# Patient Record
Sex: FEMALE | Race: WHITE | NOT HISPANIC OR LATINO
[De-identification: names, ages, dates, MRNs, and addresses within clinical notes are randomized per-mention and may not be internally consistent; named-entity substitution may affect disease eponyms.]

---

## 2018-09-21 ENCOUNTER — RESULT CHARGE (OUTPATIENT)
Age: 73
End: 2018-09-21

## 2018-09-21 ENCOUNTER — APPOINTMENT (OUTPATIENT)
Dept: ENDOCRINOLOGY | Facility: CLINIC | Age: 73
End: 2018-09-21
Payer: MEDICARE

## 2018-09-21 VITALS
WEIGHT: 203 LBS | BODY MASS INDEX: 37.36 KG/M2 | DIASTOLIC BLOOD PRESSURE: 60 MMHG | SYSTOLIC BLOOD PRESSURE: 130 MMHG | HEIGHT: 62 IN | HEART RATE: 70 BPM

## 2018-09-21 DIAGNOSIS — Z87.39 PERSONAL HISTORY OF OTHER DISEASES OF THE MUSCULOSKELETAL SYSTEM AND CONNECTIVE TISSUE: ICD-10-CM

## 2018-09-21 DIAGNOSIS — I48.92 UNSPECIFIED ATRIAL FLUTTER: ICD-10-CM

## 2018-09-21 DIAGNOSIS — H81.10 BENIGN PAROXYSMAL VERTIGO, UNSPECIFIED EAR: ICD-10-CM

## 2018-09-21 DIAGNOSIS — N28.1 CYST OF KIDNEY, ACQUIRED: ICD-10-CM

## 2018-09-21 DIAGNOSIS — G56.03 CARPAL TUNNEL SYNDROM,BILATERAL UPPER LIMBS: ICD-10-CM

## 2018-09-21 DIAGNOSIS — I25.10 ATHEROSCLEROTIC HEART DISEASE OF NATIVE CORONARY ARTERY W/OUT ANGINA PECTORIS: ICD-10-CM

## 2018-09-21 DIAGNOSIS — I47.1 SUPRAVENTRICULAR TACHYCARDIA: ICD-10-CM

## 2018-09-21 DIAGNOSIS — I10 ESSENTIAL (PRIMARY) HYPERTENSION: ICD-10-CM

## 2018-09-21 LAB — GLUCOSE BLDC GLUCOMTR-MCNC: 176

## 2018-09-21 PROCEDURE — 99214 OFFICE O/P EST MOD 30 MIN: CPT | Mod: 25

## 2018-09-21 RX ORDER — ASPIRIN 81 MG/1
81 TABLET, COATED ORAL DAILY
Refills: 0 | Status: ACTIVE | COMMUNITY

## 2018-09-21 RX ORDER — DILTIAZEM HYDROCHLORIDE 180 MG/1
180 TABLET, EXTENDED RELEASE ORAL DAILY
Refills: 0 | Status: ACTIVE | COMMUNITY

## 2018-09-21 RX ORDER — CARVEDILOL 6.25 MG/1
6.25 TABLET, FILM COATED ORAL TWICE DAILY
Refills: 0 | Status: ACTIVE | COMMUNITY

## 2018-09-21 RX ORDER — LISINOPRIL 20 MG/1
20 TABLET ORAL DAILY
Refills: 0 | Status: ACTIVE | COMMUNITY

## 2018-10-16 ENCOUNTER — MEDICATION RENEWAL (OUTPATIENT)
Age: 73
End: 2018-10-16

## 2018-12-26 ENCOUNTER — MEDICATION RENEWAL (OUTPATIENT)
Age: 73
End: 2018-12-26

## 2019-01-03 ENCOUNTER — RECORD ABSTRACTING (OUTPATIENT)
Age: 74
End: 2019-01-03

## 2019-01-08 ENCOUNTER — MEDICATION RENEWAL (OUTPATIENT)
Age: 74
End: 2019-01-08

## 2019-01-11 ENCOUNTER — APPOINTMENT (OUTPATIENT)
Dept: ENDOCRINOLOGY | Facility: CLINIC | Age: 74
End: 2019-01-11
Payer: MEDICARE

## 2019-01-11 ENCOUNTER — RESULT CHARGE (OUTPATIENT)
Age: 74
End: 2019-01-11

## 2019-01-11 VITALS
HEIGHT: 62 IN | HEART RATE: 83 BPM | DIASTOLIC BLOOD PRESSURE: 70 MMHG | BODY MASS INDEX: 37.36 KG/M2 | SYSTOLIC BLOOD PRESSURE: 130 MMHG | WEIGHT: 203 LBS

## 2019-01-11 DIAGNOSIS — L65.8 OTHER SPECIFIED NONSCARRING HAIR LOSS: ICD-10-CM

## 2019-01-11 DIAGNOSIS — L68.0 HIRSUTISM: ICD-10-CM

## 2019-01-11 DIAGNOSIS — R09.89 OTHER SPECIFIED SYMPTOMS AND SIGNS INVOLVING THE CIRCULATORY AND RESPIRATORY SYSTEMS: ICD-10-CM

## 2019-01-11 DIAGNOSIS — E11.65 TYPE 2 DIABETES MELLITUS WITH HYPERGLYCEMIA: ICD-10-CM

## 2019-01-11 LAB — GLUCOSE BLDC GLUCOMTR-MCNC: 128

## 2019-01-11 PROCEDURE — 82962 GLUCOSE BLOOD TEST: CPT

## 2019-01-11 PROCEDURE — 99215 OFFICE O/P EST HI 40 MIN: CPT | Mod: 25

## 2019-01-31 ENCOUNTER — APPOINTMENT (OUTPATIENT)
Dept: ENDOCRINOLOGY | Facility: CLINIC | Age: 74
End: 2019-01-31
Payer: MEDICARE

## 2019-01-31 PROCEDURE — 76536 US EXAM OF HEAD AND NECK: CPT

## 2019-04-11 LAB
HBA1C MFR BLD HPLC: 6.4
LDLC SERPL DIRECT ASSAY-MCNC: 48
MICROALBUMIN/CREAT 24H UR-RTO: 39

## 2019-04-12 ENCOUNTER — APPOINTMENT (OUTPATIENT)
Dept: ENDOCRINOLOGY | Facility: CLINIC | Age: 74
End: 2019-04-12
Payer: MEDICARE

## 2019-04-12 ENCOUNTER — RESULT CHARGE (OUTPATIENT)
Age: 74
End: 2019-04-12

## 2019-04-12 VITALS
HEIGHT: 62 IN | BODY MASS INDEX: 37.54 KG/M2 | WEIGHT: 204 LBS | SYSTOLIC BLOOD PRESSURE: 126 MMHG | HEART RATE: 70 BPM | DIASTOLIC BLOOD PRESSURE: 66 MMHG

## 2019-04-12 LAB — GLUCOSE BLDC GLUCOMTR-MCNC: 139

## 2019-04-12 PROCEDURE — 99214 OFFICE O/P EST MOD 30 MIN: CPT

## 2019-04-12 RX ORDER — SYRINGE AND NEEDLE,INSULIN,1ML 30 G X1/2"
30G X 1/2" SYRINGE, EMPTY DISPOSABLE MISCELLANEOUS
Qty: 1 | Refills: 1 | Status: DISCONTINUED | COMMUNITY
Start: 2019-01-08 | End: 2019-04-12

## 2019-04-12 RX ORDER — METHIMAZOLE 5 MG/1
5 TABLET ORAL DAILY
Qty: 90 | Refills: 0 | Status: DISCONTINUED | COMMUNITY
End: 2019-04-12

## 2019-04-12 RX ORDER — GLIMEPIRIDE 1 MG/1
1 TABLET ORAL DAILY
Qty: 90 | Refills: 1 | Status: DISCONTINUED | COMMUNITY
End: 2019-04-12

## 2019-04-23 ENCOUNTER — MEDICATION RENEWAL (OUTPATIENT)
Age: 74
End: 2019-04-23

## 2019-05-28 ENCOUNTER — MEDICATION RENEWAL (OUTPATIENT)
Age: 74
End: 2019-05-28

## 2019-06-03 ENCOUNTER — RX RENEWAL (OUTPATIENT)
Age: 74
End: 2019-06-03

## 2019-07-11 ENCOUNTER — APPOINTMENT (OUTPATIENT)
Dept: ENDOCRINOLOGY | Facility: CLINIC | Age: 74
End: 2019-07-11
Payer: MEDICARE

## 2019-07-11 PROCEDURE — 76536 US EXAM OF HEAD AND NECK: CPT

## 2019-07-19 ENCOUNTER — RESULT CHARGE (OUTPATIENT)
Age: 74
End: 2019-07-19

## 2019-07-19 ENCOUNTER — APPOINTMENT (OUTPATIENT)
Dept: ENDOCRINOLOGY | Facility: CLINIC | Age: 74
End: 2019-07-19
Payer: MEDICARE

## 2019-07-19 VITALS
HEART RATE: 72 BPM | DIASTOLIC BLOOD PRESSURE: 70 MMHG | SYSTOLIC BLOOD PRESSURE: 140 MMHG | BODY MASS INDEX: 38.21 KG/M2 | HEIGHT: 61.5 IN | WEIGHT: 205 LBS

## 2019-07-19 LAB — GLUCOSE BLDC GLUCOMTR-MCNC: 196

## 2019-07-19 PROCEDURE — 82962 GLUCOSE BLOOD TEST: CPT

## 2019-07-19 PROCEDURE — 99215 OFFICE O/P EST HI 40 MIN: CPT | Mod: 25

## 2019-10-18 ENCOUNTER — APPOINTMENT (OUTPATIENT)
Dept: ENDOCRINOLOGY | Facility: CLINIC | Age: 74
End: 2019-10-18
Payer: MEDICARE

## 2019-10-18 ENCOUNTER — RESULT CHARGE (OUTPATIENT)
Age: 74
End: 2019-10-18

## 2019-10-18 VITALS
SYSTOLIC BLOOD PRESSURE: 138 MMHG | WEIGHT: 204 LBS | HEART RATE: 64 BPM | DIASTOLIC BLOOD PRESSURE: 70 MMHG | BODY MASS INDEX: 38.02 KG/M2 | HEIGHT: 61.5 IN

## 2019-10-18 DIAGNOSIS — Z79.899 OTHER LONG TERM (CURRENT) DRUG THERAPY: ICD-10-CM

## 2019-10-18 LAB — GLUCOSE BLDC GLUCOMTR-MCNC: 164

## 2019-10-18 PROCEDURE — 82962 GLUCOSE BLOOD TEST: CPT

## 2019-10-18 PROCEDURE — 99215 OFFICE O/P EST HI 40 MIN: CPT | Mod: 25

## 2019-10-18 RX ORDER — METFORMIN HYDROCHLORIDE 500 MG/1
500 TABLET, COATED ORAL
Qty: 120 | Refills: 0 | Status: DISCONTINUED | COMMUNITY
Start: 2019-07-19 | End: 2019-10-18

## 2019-10-18 NOTE — HISTORY OF PRESENT ILLNESS
[FreeTextEntry1] : 1. T2DM\par Quality: Type 2\par Severity: moderate\par Duration: 20+ years\par Onset: blood test\par \par ASSOCIATED SYMPTOMS/COMPLICATIONS:\par 1/14/19 (+) DR with DME, no recent injections, stable\par (+) microalbuminuria on ACEi\par (-) neuropathy\par 2015 cardiac cath - nonobstructive disease, follows with cardio - stress test/ECHO 2018 - okay per pt\par (+) PAD - see vascular consult, asymptomatic\par \par MODIFYING FACTORS: better with diet, improved with meds\par Diet: trying to watch diet\par Exercise: none\par Current DM meds:\par Lantus 14 units at HS (using vials)\par Glimepiride 2 mg daily\par Januvia 100 mg daily\par  mg 2 tabs BID\par \par SMBG: testing 1x on most days, meter reviewed 100-150's. lowest FS 65. denies hypoglycemia\par \par 2. 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 (+) palpitations. denies changes in bowel habits. denies weight changes. \par \par 3. Thyroid nodules Dx 2011 with benign FNA - denies anterior neck pain, dysphagia. some voice changes - strain when talking\par \par 4. HLD - on Lipitor 40 mg daily\par \par \par \par \par \par

## 2019-10-18 NOTE — DATA REVIEWED
[FreeTextEntry1] : Thyroid FNA 3/16/16 RMP nodule - benign follicular nodule with post hemorrhagic change\par \par Thyroid sono 5/2018 in office - stable thyroid nodules\par \par Thyroid Ultrasound Report 1/31/19 \par Comparison: Report dated 5/17/18. \par Right thyroid lobe  5.9x2.2x2.6   cm - heterogeneous gland\par Right upper pole heterogeneous nodule 7x4 mm - not vascular, new\par Right mid pole nodule 1.0x.7x1.0cm - vascular, (+) microcalcifications, stable, prevous FNA 2016 was benign\par Left Thyroid lobe   6.0x2.8x2.7  cm - heterogeneous gland\par Left upper pole heterogeneous nodule 7x4 mm -stable\par prior Left mid pole calcification not seen on images\par Isthmus .3  cm\par Impression\par new small Right thyroid nodule, stable right and left thyroid nodules.  Largest Right thyroid nodule previously biopsied and reported as benign\par repeat sono 6 months \par \par Thyroid Ultrasound Report 7/11/19 \par Comparison: Report dated 1/31/19. \par Findings: \par Right thyroid lobe  5.4x2.4x2.5   cm, enlarged, heterogeneous\par Right upper pole nodule 9x5x6 mm - increased size, hypoechoic, not vascular\par Right mid pole nodule 7x6x10 mm with coarse calcification 1 mm- hypoechoic, stable, benign FNA in 2016\par Left Thyroid lobe   5.4x2.5x2.7  cm, enlarged, heterogeneous\par Left upper pole nodule 7x5x5 mm - hypoechoic, not vascular, stable\par Isthmus .3  cm\par Impression\par Right upper pole nodule - slowly increasing in size, repeat sono 4 months\par stable Right mid pole and Left upper pole nodules \par \par labs 9/14/18\par Cr 0.95\par GFR 60\par neg urine microalb/Cr 20\par LDL chol 37, HDl 51\par TSh 2.10 Ft4 1.1, FT3 2.8\par B12 1064\par \par Labs 4/5/19\par Gluc 115, A1c 6.4%\par Cr 0.86, GFR 68\par urine microalb/Cr 39\par LDL 48, HDL 52, Tg 104\par TSH 1.43, FT4 1.0\par A1c 6.5%\par Vit D 31\par \par Labs 1/4/19\par Cr 0.95, GFR 60\par urine microalb/Cr 34\par LDl chol 54, Tg 124\par TSh 2.03, FT4 1.2\par A1c 6.3%\par \par Labs 7/12/19\par A1c 7.4%\par urine microalb/Cr 29\par LDL 50, HDL 41, Tg 143\par TSH 0.57, FT4 1.1, Ft3 3.0\par \par Lans 10/4/19\par Gluc 137, A1c 6.5%\par Cr 0.89, GFR 65\par LDL 39, HDL 43, Tg 93\par TSH 0.15, FT4 1.2, FT3 3.6

## 2019-10-18 NOTE — PHYSICAL EXAM
[Alert] : alert [No Acute Distress] : no acute distress [Well Developed] : well developed [Well Nourished] : well nourished [Normal Sclera/Conjunctiva] : normal sclera/conjunctiva [EOMI] : extra ocular movement intact [No LAD] : no lymphadenopathy [Normal Rate and Effort] : normal respiratory rhythm and effort [Clear to Auscultation] : lungs were clear to auscultation bilaterally [Normal Rate] : heart rate was normal  [Normal S1, S2] : normal S1 and S2 [No Edema] : there was no peripheral edema [Normal Bowel Sounds] : normal bowel sounds [Soft] : abdomen soft [Not Tender] : non-tender [Not Distended] : not distended [Normal Gait] : normal gait [No Clubbing, Cyanosis] : no clubbing  or cyanosis of the fingernails [Cranial Nerves Intact] : cranial nerves 2-12 were intact [Normal Reflexes] : deep tendon reflexes were 2+ and symmetric [No Tremors] : no tremors [Oriented x3] : oriented to person, place, and time [Normal Insight/Judgement] : insight and judgment were intact [Normal Affect] : the affect was normal [de-identified] : bilateral thyroid nodules - firm, nontender

## 2019-10-18 NOTE — REVIEW OF SYSTEMS
[Blurry Vision] : blurred vision [Palpitations] : palpitations [SOB on Exertion] : shortness of breath during exertion [Nocturia] : nocturia [Myalgia] : myalgia  [Joint Stiffness] : joint stiffness [Back Pain] : back pain [Hair Loss] : hair loss [As Noted in HPI] : as noted in HPI [Fatigue] : no fatigue [Shortness Of Breath] : no shortness of breath [Chest Pain] : no chest pain [Nausea] : no nausea [Vomiting] : no vomiting was observed [Constipation] : no constipation [Diarrhea] : no diarrhea [Polyuria] : no polyuria [Abdominal Pain] : no abdominal pain [Pain/Numbness of Digits] : no pain/numbness of digits [Tremors] : no tremors [Anxiety] : no anxiety [Depression] : no depression [Polydipsia] : no polydipsia [FreeTextEntry3] : follows with retina [FreeTextEntry6] : cardio aware [FreeTextEntry5] : intermittent, following with cardio [FreeTextEntry8] : 1-2x nightly [FreeTextEntry9] : ankle stiffness, following with vascular [de-identified] : chin hairs

## 2019-10-18 NOTE — ASSESSMENT
[FreeTextEntry1] : 1. 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, CAD, microalbuminuria, PAD -improved A1c\par - cont Januvia and MFN and Glimepiride\par - cont Lantus\par - repeat A1c 3 months\par - cont SMBG, call with highs/lows\par - cont lifestyle changes\par - f/u cardio\par - f/u vascular as needed, if sx develop\par - cont Lisinopril \par - check B12 on MFN\par \par 2. Thyroid nodules, increased size of Right nodule on recent sono, repeat sono before next visit\par \par 3. Hyperthyroid - TSh now low off Methimazole and pt experiencing some palpitatiosn - restart MMi 5 mg daily\par \par 4. Hyperlipidemia - stable, cont statin\par \par \par

## 2019-10-18 NOTE — REASON FOR VISIT
[Follow-Up: _____] : a [unfilled] follow-up visit [FreeTextEntry1] : T2DM, hyperthyroidism, thyroid nodules, hyperlipidemia

## 2019-11-14 ENCOUNTER — APPOINTMENT (OUTPATIENT)
Dept: ENDOCRINOLOGY | Facility: CLINIC | Age: 74
End: 2019-11-14
Payer: MEDICARE

## 2019-11-14 ENCOUNTER — RX RENEWAL (OUTPATIENT)
Age: 74
End: 2019-11-14

## 2019-11-14 PROCEDURE — 76536 US EXAM OF HEAD AND NECK: CPT

## 2019-11-25 ENCOUNTER — MEDICATION RENEWAL (OUTPATIENT)
Age: 74
End: 2019-11-25

## 2019-12-16 ENCOUNTER — RX RENEWAL (OUTPATIENT)
Age: 74
End: 2019-12-16

## 2020-01-07 ENCOUNTER — RX RENEWAL (OUTPATIENT)
Age: 75
End: 2020-01-07

## 2020-01-17 ENCOUNTER — APPOINTMENT (OUTPATIENT)
Dept: ENDOCRINOLOGY | Facility: CLINIC | Age: 75
End: 2020-01-17
Payer: MEDICARE

## 2020-01-17 ENCOUNTER — RESULT CHARGE (OUTPATIENT)
Age: 75
End: 2020-01-17

## 2020-01-17 VITALS
WEIGHT: 203 LBS | BODY MASS INDEX: 39.85 KG/M2 | HEIGHT: 60 IN | SYSTOLIC BLOOD PRESSURE: 142 MMHG | HEART RATE: 69 BPM | DIASTOLIC BLOOD PRESSURE: 74 MMHG

## 2020-01-17 LAB — GLUCOSE BLDC GLUCOMTR-MCNC: 143

## 2020-01-17 PROCEDURE — 99214 OFFICE O/P EST MOD 30 MIN: CPT | Mod: 25

## 2020-01-17 PROCEDURE — 82962 GLUCOSE BLOOD TEST: CPT

## 2020-01-17 NOTE — HISTORY OF PRESENT ILLNESS
[FreeTextEntry1] : since last visit Cspine issues with pinched nerve and going for PT\par \par 1. T2DM\par Quality: Type 2\par Severity: moderate\par Duration: 20+ years\par Onset: blood test\par \par ASSOCIATED SYMPTOMS/COMPLICATIONS:\par 1/14/19 (+) DR with DME, no recent injections, stable\par (+) microalbuminuria on ACEi\par (-) neuropathy\par 2015 cardiac cath - nonobstructive disease, follows with cardio - stress test/ECHO 2018 - okay per pt\par (+) PAD - see vascular consult, asymptomatic\par \par MODIFYING FACTORS: better with diet, improved with meds\par Diet: trying to watch diet\par Exercise: none\par Current DM meds:\par Lantus 14-15 units at HS (using vials)\par Glimepiride 2 mg daily\par Januvia 100 mg daily\par  mg 2 tabs BID\par \par SMBG: testing 1x on most days, meter reviewed 's. lowest FS 65 - once since last visit, due to decreased PO intake\par \par 2. 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  (+) palpitations, restarted MMI at last visit 5 mg daily. denies changes in bowel habits. denies weight changes. \par \par 3. Thyroid nodules Dx 2011 with benign FNA - denies anterior neck pain, dysphagia or voice changes. sometimes pills get stuck\par \par 4. HLD - on Lipitor 40 mg daily\par \par \par \par \par \par

## 2020-01-17 NOTE — PHYSICAL EXAM
[Alert] : alert [No Acute Distress] : no acute distress [Well Nourished] : well nourished [Normal Sclera/Conjunctiva] : normal sclera/conjunctiva [Well Developed] : well developed [EOMI] : extra ocular movement intact [No LAD] : no lymphadenopathy [Normal Rate and Effort] : normal respiratory rhythm and effort [Clear to Auscultation] : lungs were clear to auscultation bilaterally [Normal Rate] : heart rate was normal  [Normal S1, S2] : normal S1 and S2 [No Edema] : there was no peripheral edema [Normal Bowel Sounds] : normal bowel sounds [Not Tender] : non-tender [Soft] : abdomen soft [Not Distended] : not distended [Normal Gait] : normal gait [No Clubbing, Cyanosis] : no clubbing  or cyanosis of the fingernails [Cranial Nerves Intact] : cranial nerves 2-12 were intact [Normal Reflexes] : deep tendon reflexes were 2+ and symmetric [No Tremors] : no tremors [Oriented x3] : oriented to person, place, and time [Normal Insight/Judgement] : insight and judgment were intact [Normal Affect] : the affect was normal [Right Foot Was Examined] : right foot ~C was examined [Left Foot Was Examined] : left foot ~C was examined [2+] : 2+ in the dorsalis pedis [Vibration Dec.] : diminished vibratory sensation at the level of the toes [Foot Ulcers] : no foot ulcers [Position Sense Dec.] : normal position sense at the level of the toes [de-identified] : bilateral thyroid nodules - firm, nontender [Diminished Throughout Both Feet] : normal tactile sensation with monofilament testing throughout both feet

## 2020-01-17 NOTE — ASSESSMENT
[FreeTextEntry1] : 1. 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, CAD, microalbuminuria, PAD -worsening A1c due to holiday diet, b12 level ok on MFN\par - cont Januvia and MFN and Glimepiride\par - cont Lantus\par - restart diet\par - repeat A1c 3 months\par - cont SMBG, call with highs/lows, increase testing 2x daily, test before bedtime and have bedtime snack if FS <100\par - f/u cardio\par - f/u vascular as needed, if sx develop\par - cont Lisinopril \par - f/u retina\par \par 2. stable Thyroid nodules - repeat sono 11/2020\par \par 3. Hyperthyroid - euthyroid- contMMi 5 mg daily\par \par 4. Hyperlipidemia - stable, cont statin\par \par \par

## 2020-01-17 NOTE — REVIEW OF SYSTEMS
[Blurry Vision] : blurred vision [As Noted in HPI] : as noted in HPI [Palpitations] : palpitations [SOB on Exertion] : shortness of breath during exertion [Nocturia] : nocturia [Myalgia] : myalgia  [Back Pain] : back pain [Hair Loss] : hair loss [Depression] : depression [Recent Weight Gain (___ Lbs)] : no recent weight gain [Fatigue] : no fatigue [Chest Pain] : no chest pain [Recent Weight Loss (___ Lbs)] : no recent weight loss [Shortness Of Breath] : no shortness of breath [Nausea] : no nausea [Vomiting] : no vomiting was observed [Diarrhea] : no diarrhea [Constipation] : no constipation [Abdominal Pain] : no abdominal pain [Tremors] : no tremors [Polyuria] : no polyuria [Anxiety] : no anxiety [Pain/Numbness of Digits] : no pain/numbness of digits [Polydipsia] : no polydipsia [FreeTextEntry3] : follows with retina [Heat Intolerance] : heat tolerant [FreeTextEntry8] : 1-2x nightly [FreeTextEntry5] : intermittent, following with cardio [FreeTextEntry6] : cardio aware [de-identified] : chin hairs

## 2020-01-17 NOTE — DATA REVIEWED
[FreeTextEntry1] : Thyroid FNA 3/16/16 RMP nodule - benign follicular nodule with post hemorrhagic change\par \par Thyroid sono 5/2018 in office - stable thyroid nodules\par \par Thyroid Ultrasound Report 1/31/19 \par Comparison: Report dated 5/17/18. \par Right thyroid lobe  5.9x2.2x2.6   cm - heterogeneous gland\par Right upper pole heterogeneous nodule 7x4 mm - not vascular, new\par Right mid pole nodule 1.0x.7x1.0cm - vascular, (+) microcalcifications, stable, prevous FNA 2016 was benign\par Left Thyroid lobe   6.0x2.8x2.7  cm - heterogeneous gland\par Left upper pole heterogeneous nodule 7x4 mm -stable\par prior Left mid pole calcification not seen on images\par Isthmus .3  cm\par Impression\par new small Right thyroid nodule, stable right and left thyroid nodules.  Largest Right thyroid nodule previously biopsied and reported as benign\par repeat sono 6 months \par \par Thyroid Ultrasound Report 7/11/19 \par Comparison: Report dated 1/31/19. \par Findings: \par Right thyroid lobe  5.4x2.4x2.5   cm, enlarged, heterogeneous\par Right upper pole nodule 9x5x6 mm - increased size, hypoechoic, not vascular\par Right mid pole nodule 7x6x10 mm with coarse calcification 1 mm- hypoechoic, stable, benign FNA in 2016\par Left Thyroid lobe   5.4x2.5x2.7  cm, enlarged, heterogeneous\par Left upper pole nodule 7x5x5 mm - hypoechoic, not vascular, stable\par Isthmus .3  cm\par Impression\par Right upper pole nodule - slowly increasing in size, repeat sono 4 months\par stable Right mid pole and Left upper pole nodules\par \par Thyroid Ultrasound Report 11/14/19 \par \par Comparison: Report dated 7/11/19. \par Findings: \par Right thyroid lobe  4.9x2.2x2.2   cm - mildly heterogeneous\par Right upper pole nodule 9x5x6 mm, stable\par Right mid pole nodule [id number] mm with 1 mm coarse calcification, scant vascularity, stable, benign FNA in 2018\par Left Thyroid lobe  5.2x2.8x2.9   cm - mildly heterogeneous\par Left upper pole nodule 7x6x5 mm - hypoechoic, stable\par Isthmus  .3 cm\par Impression\par stable bilateral thyroid nodules\par largest Right mid pole thyroid nodule previously biopsied and reported as benign\par repeat thyroid sonogram 1 year  \par \par labs 9/14/18\par Cr 0.95\par GFR 60\par neg urine microalb/Cr 20\par LDL chol 37, HDl 51\par TSh 2.10 Ft4 1.1, FT3 2.8\par B12 1064\par \par Labs 4/5/19\par Gluc 115, A1c 6.4%\par Cr 0.86, GFR 68\par urine microalb/Cr 39\par LDL 48, HDL 52, Tg 104\par TSH 1.43, FT4 1.0\par A1c 6.5%\par Vit D 31\par \par Labs 1/4/19\par Cr 0.95, GFR 60\par urine microalb/Cr 34\par LDl chol 54, Tg 124\par TSh 2.03, FT4 1.2\par A1c 6.3%\par \par Labs 7/12/19\par A1c 7.4%\par urine microalb/Cr 29\par LDL 50, HDL 41, Tg 143\par TSH 0.57, FT4 1.1, Ft3 3.0\par \par Lans 10/4/19\par Gluc 137, A1c 6.5%\par Cr 0.89, GFR 65\par LDL 39, HDL 43, Tg 93\par TSH 0.15, FT4 1.2, FT3 3.6\par \par Labs 1/10/20\par Gluc 121, A1c 7.1\par Cr 0.85, GFR 68\par urine microalb/Cr 67\par LDL 46, HDL 57, Tg 78\par TSH 0.91, FT4 0.9\par mild anemia\par B12 707

## 2020-01-30 ENCOUNTER — RX RENEWAL (OUTPATIENT)
Age: 75
End: 2020-01-30

## 2020-04-27 ENCOUNTER — RX RENEWAL (OUTPATIENT)
Age: 75
End: 2020-04-27

## 2020-05-22 ENCOUNTER — RX RENEWAL (OUTPATIENT)
Age: 75
End: 2020-05-22

## 2020-07-15 ENCOUNTER — RX RENEWAL (OUTPATIENT)
Age: 75
End: 2020-07-15

## 2020-08-05 ENCOUNTER — RX RENEWAL (OUTPATIENT)
Age: 75
End: 2020-08-05

## 2020-10-10 ENCOUNTER — RX RENEWAL (OUTPATIENT)
Age: 75
End: 2020-10-10

## 2020-10-18 ENCOUNTER — RX RENEWAL (OUTPATIENT)
Age: 75
End: 2020-10-18

## 2020-11-10 ENCOUNTER — RX RENEWAL (OUTPATIENT)
Age: 75
End: 2020-11-10

## 2020-11-10 LAB
HBA1C MFR BLD HPLC: 6.8
LDLC SERPL DIRECT ASSAY-MCNC: 48

## 2020-11-11 ENCOUNTER — APPOINTMENT (OUTPATIENT)
Dept: ENDOCRINOLOGY | Facility: CLINIC | Age: 75
End: 2020-11-11
Payer: MEDICARE

## 2020-11-11 VITALS
OXYGEN SATURATION: 96 % | HEIGHT: 62 IN | SYSTOLIC BLOOD PRESSURE: 156 MMHG | DIASTOLIC BLOOD PRESSURE: 60 MMHG | BODY MASS INDEX: 38.09 KG/M2 | WEIGHT: 207 LBS | HEART RATE: 79 BPM

## 2020-11-11 LAB — GLUCOSE BLDC GLUCOMTR-MCNC: 80

## 2020-11-11 PROCEDURE — 82962 GLUCOSE BLOOD TEST: CPT

## 2020-11-11 PROCEDURE — 99214 OFFICE O/P EST MOD 30 MIN: CPT | Mod: 25

## 2020-11-11 RX ORDER — ATORVASTATIN CALCIUM 80 MG/1
80 TABLET, FILM COATED ORAL
Qty: 90 | Refills: 0 | Status: ACTIVE | COMMUNITY
Start: 2020-09-24

## 2020-11-11 RX ORDER — ATORVASTATIN CALCIUM 40 MG/1
40 TABLET, FILM COATED ORAL DAILY
Refills: 0 | Status: DISCONTINUED | COMMUNITY
End: 2020-11-11

## 2020-11-11 NOTE — PHYSICAL EXAM
[Alert] : alert [Well Nourished] : well nourished [Obese] : obese [No Acute Distress] : no acute distress [EOMI] : extra ocular movement intact [No LAD] : no lymphadenopathy [Thyroid Not Enlarged] : the thyroid was not enlarged [No Thyroid Nodules] : no palpable thyroid nodules [No Accessory Muscle Use] : no accessory muscle use [Clear to Auscultation] : lungs were clear to auscultation bilaterally [Normal S1, S2] : normal S1 and S2 [Normal Rate] : heart rate was normal [No Edema] : no peripheral edema [Normal Bowel Sounds] : normal bowel sounds [Not Tender] : non-tender [Soft] : abdomen soft [Normal Gait] : normal gait [Acanthosis Nigricans] : no acanthosis nigricans [Right Foot Was Examined] : right foot ~C was examined [Left Foot Was Examined] : left foot ~C was examined [2+] : 2+ in the dorsalis pedis [Vibration Dec.] : diminished vibratory sensation at the level of the toes [Position Sense Dec.] : normal position sense at the level of the toes [Diminished Throughout Both Feet] : normal tactile sensation with monofilament testing throughout both feet [Cranial Nerves Intact] : cranial nerves 2-12 were intact [Oriented x3] : oriented to person, place, and time [Normal Affect] : the affect was normal [Normal Insight/Judgement] : insight and judgment were intact [Normal Mood] : the mood was normal
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
## 2020-11-11 NOTE — DATA REVIEWED
[FreeTextEntry1] : Thyroid FNA 3/16/16 RMP nodule - benign follicular nodule with post hemorrhagic change\par \par Thyroid sono 5/2018 in office - stable thyroid nodules\par \par Thyroid Ultrasound Report 1/31/19 \par Comparison: Report dated 5/17/18. \par Right thyroid lobe  5.9x2.2x2.6   cm - heterogeneous gland\par Right upper pole heterogeneous nodule 7x4 mm - not vascular, new\par Right mid pole nodule 1.0x.7x1.0cm - vascular, (+) microcalcifications, stable, prevous FNA 2016 was benign\par Left Thyroid lobe   6.0x2.8x2.7  cm - heterogeneous gland\par Left upper pole heterogeneous nodule 7x4 mm -stable\par prior Left mid pole calcification not seen on images\par Isthmus .3  cm\par Impression\par new small Right thyroid nodule, stable right and left thyroid nodules.  Largest Right thyroid nodule previously biopsied and reported as benign\par repeat sono 6 months \par \par Thyroid Ultrasound Report 7/11/19 \par Comparison: Report dated 1/31/19. \par Findings: \par Right thyroid lobe  5.4x2.4x2.5   cm, enlarged, heterogeneous\par Right upper pole nodule 9x5x6 mm - increased size, hypoechoic, not vascular\par Right mid pole nodule 7x6x10 mm with coarse calcification 1 mm- hypoechoic, stable, benign FNA in 2016\par Left Thyroid lobe   5.4x2.5x2.7  cm, enlarged, heterogeneous\par Left upper pole nodule 7x5x5 mm - hypoechoic, not vascular, stable\par Isthmus .3  cm\par Impression\par Right upper pole nodule - slowly increasing in size, repeat sono 4 months\par stable Right mid pole and Left upper pole nodules\par \par Thyroid Ultrasound Report 11/14/19 \par Comparison: Report dated 7/11/19. \par Findings: \par Right thyroid lobe  4.9x2.2x2.2   cm - mildly heterogeneous\par Right upper pole nodule 9x5x6 mm, stable\par Right mid pole nodule [id number] mm with 1 mm coarse calcification, scant vascularity, stable, benign FNA in 2018\par Left Thyroid lobe  5.2x2.8x2.9   cm - mildly heterogeneous\par Left upper pole nodule 7x6x5 mm - hypoechoic, stable\par Isthmus  .3 cm\par Impression\par stable bilateral thyroid nodules\par largest Right mid pole thyroid nodule previously biopsied and reported as benign\par repeat thyroid sonogram 1 year  \par \par labs 9/14/18\par Cr 0.95\par GFR 60\par neg urine microalb/Cr 20\par LDL chol 37, HDl 51\par TSh 2.10 Ft4 1.1, FT3 2.8\par B12 1064\par \par Labs 4/5/19\par Gluc 115, A1c 6.4%\par Cr 0.86, GFR 68\par urine microalb/Cr 39\par LDL 48, HDL 52, Tg 104\par TSH 1.43, FT4 1.0\par A1c 6.5%\par Vit D 31\par \par Labs 1/4/19\par Cr 0.95, GFR 60\par urine microalb/Cr 34\par LDl chol 54, Tg 124\par TSh 2.03, FT4 1.2\par A1c 6.3%\par \par Labs 7/12/19\par A1c 7.4%\par urine microalb/Cr 29\par LDL 50, HDL 41, Tg 143\par TSH 0.57, FT4 1.1, Ft3 3.0\par \par Lans 10/4/19\par Gluc 137, A1c 6.5%\par Cr 0.89, GFR 65\par LDL 39, HDL 43, Tg 93\par TSH 0.15, FT4 1.2, FT3 3.6\par \par Labs 1/10/20\par Gluc 121, A1c 7.1\par Cr 0.85, GFR 68\par urine microalb/Cr 67\par LDL 46, HDL 57, Tg 78\par TSH 0.91, FT4 0.9\par mild anemia\par B12 707\par \par Labs 11/4/20\par urine alb/Cr 39\par LDL 48, HDL 52, Tg 102\par Gluc 139, A1c 6.8\par Cr 0.90, GFR 63\par TSH 2.38, Ft4 1.1\par Hct 33.1

## 2020-11-11 NOTE — ASSESSMENT
[FreeTextEntry1] : 1. 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 stable Thyroid nodules - RTO thyroid sono\par \par 3. Hyperthyroid - euthyroid- cont MMi 5 mg daily\par \par 4. Hyperlipidemia - stable, cont statin\par \par 5. anemia, chronic by history - advvised heme eval\par \par \par

## 2020-11-11 NOTE — REVIEW OF SYSTEMS
[Shortness Of Breath] : shortness of breath [SOB on Exertion] : shortness of breath on exertion [Nocturia] : nocturia [Anxiety] : anxiety [Stress] : stress [Fatigue] : no fatigue [Recent Weight Gain (___ Lbs)] : no recent weight gain [Recent Weight Loss (___ Lbs)] : no recent weight loss [Blurred Vision] : no blurred vision [Chest Pain] : no chest pain [Palpitations] : no palpitations [Nausea] : no nausea [Abdominal Pain] : no abdominal pain [Diarrhea] : no diarrhea [Polyuria] : no polyuria [Joint Pain] : no joint pain [Myalgia] : no myalgia  [Pain/Numbness of Digits] : no pain/numbness of digits [Polydipsia] : no polydipsia [FreeTextEntry6] : cardio aware

## 2021-01-04 ENCOUNTER — RX RENEWAL (OUTPATIENT)
Age: 76
End: 2021-01-04

## 2021-01-12 ENCOUNTER — RX RENEWAL (OUTPATIENT)
Age: 76
End: 2021-01-12

## 2021-01-21 ENCOUNTER — APPOINTMENT (OUTPATIENT)
Dept: ENDOCRINOLOGY | Facility: CLINIC | Age: 76
End: 2021-01-21
Payer: MEDICARE

## 2021-01-21 PROCEDURE — 76536 US EXAM OF HEAD AND NECK: CPT

## 2021-02-01 ENCOUNTER — RX RENEWAL (OUTPATIENT)
Age: 76
End: 2021-02-01

## 2021-02-02 ENCOUNTER — NON-APPOINTMENT (OUTPATIENT)
Age: 76
End: 2021-02-02

## 2021-02-11 ENCOUNTER — RESULT CHARGE (OUTPATIENT)
Age: 76
End: 2021-02-11

## 2021-02-11 LAB
HBA1C MFR BLD HPLC: 7.9
LDLC SERPL DIRECT ASSAY-MCNC: 48
MICROALBUMIN/CREAT 24H UR-RTO: 52

## 2021-02-12 ENCOUNTER — APPOINTMENT (OUTPATIENT)
Dept: ENDOCRINOLOGY | Facility: CLINIC | Age: 76
End: 2021-02-12
Payer: MEDICARE

## 2021-02-12 VITALS
DIASTOLIC BLOOD PRESSURE: 66 MMHG | HEART RATE: 60 BPM | HEIGHT: 62 IN | BODY MASS INDEX: 37.73 KG/M2 | OXYGEN SATURATION: 98 % | WEIGHT: 205 LBS | SYSTOLIC BLOOD PRESSURE: 134 MMHG

## 2021-02-12 LAB — GLUCOSE BLDC GLUCOMTR-MCNC: 105

## 2021-02-12 PROCEDURE — 82962 GLUCOSE BLOOD TEST: CPT

## 2021-02-12 PROCEDURE — 99214 OFFICE O/P EST MOD 30 MIN: CPT | Mod: 25

## 2021-02-12 NOTE — ASSESSMENT
[FreeTextEntry1] : 1. 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, CAD, microalbuminuria, PAD  - siginificant worsening A1c due to stress and poor eating habits\par - cont Januvia and MFN\par - increase Glimepiride 4 mg daily\par - contLantus 12 units\par - restart diet\par - repeat A1c 3 months\par - f/u cardio\par - f/u vascular as needed, if sx develop\par - cont Lisinopril \par - f/u retina\par - counseled pt on stress reduction\par \par 2. thyroid nodules - worsening on recent sono, repeat thyroid sono 3 months to monitor new area of concern\par \par 3. Hyperthyroid - euthyroid- cont MMi 5 mg daily, repeat TFTs 1 week before next visit\par \par 4. Hyperlipidemia - stable, cont statin\par \par 5. anemia, chronic by history - advised heme eval\par \par 6. voice hoarseness - ENT eval\par \par \par

## 2021-02-12 NOTE — DATA REVIEWED
[FreeTextEntry1] : Thyroid FNA 3/16/16 RMP nodule - benign follicular nodule with post hemorrhagic change\par \par Thyroid sono 5/2018 in office - stable thyroid nodules\par \par Thyroid Ultrasound Report 1/31/19 \par Comparison: Report dated 5/17/18. \par Right thyroid lobe  5.9x2.2x2.6   cm - heterogeneous gland\par Right upper pole heterogeneous nodule 7x4 mm - not vascular, new\par Right mid pole nodule 1.0x.7x1.0cm - vascular, (+) microcalcifications, stable, prevous FNA 2016 was benign\par Left Thyroid lobe   6.0x2.8x2.7  cm - heterogeneous gland\par Left upper pole heterogeneous nodule 7x4 mm -stable\par prior Left mid pole calcification not seen on images\par Isthmus .3  cm\par Impression\par new small Right thyroid nodule, stable right and left thyroid nodules.  Largest Right thyroid nodule previously biopsied and reported as benign\par repeat sono 6 months \par \par Thyroid Ultrasound Report 7/11/19 \par Comparison: Report dated 1/31/19. \par Findings: \par Right thyroid lobe  5.4x2.4x2.5   cm, enlarged, heterogeneous\par Right upper pole nodule 9x5x6 mm - increased size, hypoechoic, not vascular\par Right mid pole nodule 7x6x10 mm with coarse calcification 1 mm- hypoechoic, stable, benign FNA in 2016\par Left Thyroid lobe   5.4x2.5x2.7  cm, enlarged, heterogeneous\par Left upper pole nodule 7x5x5 mm - hypoechoic, not vascular, stable\par Isthmus .3  cm\par Impression\par Right upper pole nodule - slowly increasing in size, repeat sono 4 months\par stable Right mid pole and Left upper pole nodules\par \par Thyroid Ultrasound Report 11/14/19 \par Comparison: Report dated 7/11/19. \par Findings: \par Right thyroid lobe  4.9x2.2x2.2   cm - mildly heterogeneous\par Right upper pole nodule 9x5x6 mm, stable\par Right mid pole nodule [id number] mm with 1 mm coarse calcification, scant vascularity, stable, benign FNA in 2018\par Left Thyroid lobe  5.2x2.8x2.9   cm - mildly heterogeneous\par Left upper pole nodule 7x6x5 mm - hypoechoic, stable\par Isthmus  .3 cm\par Impression\par stable bilateral thyroid nodules\par largest Right mid pole thyroid nodule previously biopsied and reported as benign\par repeat thyroid sonogram 1 year  \par \par Thyroid Ultrasound Report 1/21/21 \par Comparison: Report dated 11/14/19. \par Findings: \par Right thyroid lobe  5.1x2.4x2.3   cm -enlarged, homogeneous gland\par Right upper pole nodule 9x6x6 mm - hypoechoic, not vascular, stable\par Right mid pole nodule 12x7 mm with 1 mm coarse calcification - vascular, stable, benign FNA in 2018\par Left Thyroid lobe  5.9x2.7x2.5   cm - enlarged, homogeneous gland\par Left  upper pole nodule 8x6x6 mm - stable, hypoechoic\par Left Lower pole area [id number] mm - heterogeneous are vs nodule\par Isthmus  .4 cm\par Impression\par stable bilateral thyroid nodule\par possible new Left lower pole nodule vs patchy area of heterogeneous tissue\par repeat sonogram 4 months \par =======================================================\par labs 9/14/18\par Cr 0.95\par GFR 60\par neg urine microalb/Cr 20\par LDL chol 37, HDl 51\par TSh 2.10 Ft4 1.1, FT3 2.8\par B12 1064\par \par Labs 4/5/19\par Gluc 115, A1c 6.4%\par Cr 0.86, GFR 68\par urine microalb/Cr 39\par LDL 48, HDL 52, Tg 104\par TSH 1.43, FT4 1.0\par A1c 6.5%\par Vit D 31\par \par Labs 1/4/19\par Cr 0.95, GFR 60\par urine microalb/Cr 34\par LDl chol 54, Tg 124\par TSh 2.03, FT4 1.2\par A1c 6.3%\par \par Labs 7/12/19\par A1c 7.4%\par urine microalb/Cr 29\par LDL 50, HDL 41, Tg 143\par TSH 0.57, FT4 1.1, Ft3 3.0\par \par Lans 10/4/19\par Gluc 137, A1c 6.5%\par Cr 0.89, GFR 65\par LDL 39, HDL 43, Tg 93\par TSH 0.15, FT4 1.2, FT3 3.6\par \par Labs 1/10/20\par Gluc 121, A1c 7.1\par Cr 0.85, GFR 68\par urine microalb/Cr 67\par LDL 46, HDL 57, Tg 78\par TSH 0.91, FT4 0.9\par mild anemia\par B12 707\par \par Labs 11/4/20\par urine alb/Cr 39\par LDL 48, HDL 52, Tg 102\par Gluc 139, A1c 6.8\par Cr 0.90, GFR 63\par TSH 2.38, Ft4 1.1\par Hct 33.1\par \par Labs 2/4/21\par urine alb/Cr 52\par LDL 48, HDL 41, Tg 121\par GLuc 164, A1c 7.9\par TSH 2.14\par Cr 0.91, GFR 63\par Hb 10.7, Hct 33.3\par B12 772

## 2021-02-12 NOTE — PHYSICAL EXAM
[Alert] : alert [Well Nourished] : well nourished [Obese] : obese [No Acute Distress] : no acute distress [EOMI] : extra ocular movement intact [No LAD] : no lymphadenopathy [Thyroid Not Enlarged] : the thyroid was not enlarged [No Thyroid Nodules] : no palpable thyroid nodules [No Accessory Muscle Use] : no accessory muscle use [Clear to Auscultation] : lungs were clear to auscultation bilaterally [Normal S1, S2] : normal S1 and S2 [Normal Rate] : heart rate was normal [No Edema] : no peripheral edema [Normal Bowel Sounds] : normal bowel sounds [Not Tender] : non-tender [Soft] : abdomen soft [Normal Gait] : normal gait [Cranial Nerves Intact] : cranial nerves 2-12 were intact [Oriented x3] : oriented to person, place, and time [Normal Affect] : the affect was normal [Normal Insight/Judgement] : insight and judgment were intact [Normal Mood] : the mood was normal [Acanthosis Nigricans] : no acanthosis nigricans

## 2021-02-12 NOTE — HISTORY OF PRESENT ILLNESS
[FreeTextEntry1] : Interval Hx - stress at home  w vielka\par \par 1. T2DM\par Quality: Type 2\par Severity: moderate\par Duration: 20+ years\par Onset: blood test\par \par ASSOCIATED SYMPTOMS/COMPLICATIONS:\par 1/14/19 (+) DR with DME, no recent injections, stable\par (+) microalbuminuria on ACEi\par (-) neuropathy\par 2015 cardiac cath - nonobstructive disease, follows with cardio - stress test/ECHO 2018 - okay per pt\par (+) PAD -  asymptomatic\par \par MODIFYING FACTORS: worse due to diet, stress eating\par Diet: stress eating\par Exercise: none\par Current DM meds:\par Lantus 12 units at HS (using vials)\par Glimepiride 2 mg daily\par Januvia 100 mg daily\par  mg 2 tabs BID\par \par SMBG: testing 1x, per pt FS ;s\par \par 2. 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  (+) palpitations and restarted MMI 10/2019 5 mg daily. denies changes in bowel habits. denies weight changes. \par \par 3. Thyroid nodules Dx 2011 with benign FNA - denies anterior neck pain, dysphagia. (+) occasional raspiness when stretching neck\par \par 4. HLD - on Lipitor 40 mg daily\par \par \par \par \par \par

## 2021-02-23 ENCOUNTER — RX RENEWAL (OUTPATIENT)
Age: 76
End: 2021-02-23

## 2021-05-19 ENCOUNTER — APPOINTMENT (OUTPATIENT)
Dept: ENDOCRINOLOGY | Facility: CLINIC | Age: 76
End: 2021-05-19

## 2021-05-26 LAB
HBA1C MFR BLD HPLC: 7.4
LDLC SERPL DIRECT ASSAY-MCNC: 48

## 2021-05-27 ENCOUNTER — APPOINTMENT (OUTPATIENT)
Dept: ENDOCRINOLOGY | Facility: CLINIC | Age: 76
End: 2021-05-27
Payer: MEDICARE

## 2021-05-27 VITALS
BODY MASS INDEX: 38.09 KG/M2 | SYSTOLIC BLOOD PRESSURE: 130 MMHG | OXYGEN SATURATION: 98 % | HEART RATE: 78 BPM | DIASTOLIC BLOOD PRESSURE: 96 MMHG | WEIGHT: 207 LBS | HEIGHT: 62 IN

## 2021-05-27 DIAGNOSIS — R49.9 UNSPECIFIED VOICE AND RESONANCE DISORDER: ICD-10-CM

## 2021-05-27 DIAGNOSIS — E11.319 TYPE 2 DIABETES MELLITUS WITH UNSPECIFIED DIABETIC RETINOPATHY W/OUT MACULAR EDEMA: ICD-10-CM

## 2021-05-27 DIAGNOSIS — D64.9 ANEMIA, UNSPECIFIED: ICD-10-CM

## 2021-05-27 PROCEDURE — 82962 GLUCOSE BLOOD TEST: CPT

## 2021-05-27 PROCEDURE — 99214 OFFICE O/P EST MOD 30 MIN: CPT | Mod: 25

## 2021-05-27 NOTE — REVIEW OF SYSTEMS
[Shortness Of Breath] : shortness of breath [SOB on Exertion] : shortness of breath on exertion [Nocturia] : nocturia [Anxiety] : anxiety [Stress] : stress [Chest Pain] : chest pain [Palpitations] : palpitations [Fatigue] : no fatigue [Recent Weight Gain (___ Lbs)] : no recent weight gain [Recent Weight Loss (___ Lbs)] : no recent weight loss [Blurred Vision] : no blurred vision [Nausea] : no nausea [Abdominal Pain] : no abdominal pain [Diarrhea] : no diarrhea [Polyuria] : no polyuria [Joint Pain] : no joint pain [Myalgia] : no myalgia  [Pain/Numbness of Digits] : no pain/numbness of digits [Polydipsia] : no polydipsia [FreeTextEntry5] : cardio workup in progress [FreeTextEntry6] : cardio work up in progress

## 2021-05-27 NOTE — DATA REVIEWED
[FreeTextEntry1] : Thyroid FNA 3/16/16 RMP nodule - benign follicular nodule with post hemorrhagic change\par \par Thyroid sono 5/2018 in office - stable thyroid nodules\par \par Thyroid Ultrasound Report 1/31/19 \par Comparison: Report dated 5/17/18. \par Right thyroid lobe  5.9x2.2x2.6   cm - heterogeneous gland\par Right upper pole heterogeneous nodule 7x4 mm - not vascular, new\par Right mid pole nodule 1.0x.7x1.0cm - vascular, (+) microcalcifications, stable, prevous FNA 2016 was benign\par Left Thyroid lobe   6.0x2.8x2.7  cm - heterogeneous gland\par Left upper pole heterogeneous nodule 7x4 mm -stable\par prior Left mid pole calcification not seen on images\par Isthmus .3  cm\par Impression\par new small Right thyroid nodule, stable right and left thyroid nodules.  Largest Right thyroid nodule previously biopsied and reported as benign\par repeat sono 6 months \par \par Thyroid Ultrasound Report 7/11/19 \par Comparison: Report dated 1/31/19. \par Findings: \par Right thyroid lobe  5.4x2.4x2.5   cm, enlarged, heterogeneous\par Right upper pole nodule 9x5x6 mm - increased size, hypoechoic, not vascular\par Right mid pole nodule 7x6x10 mm with coarse calcification 1 mm- hypoechoic, stable, benign FNA in 2016\par Left Thyroid lobe   5.4x2.5x2.7  cm, enlarged, heterogeneous\par Left upper pole nodule 7x5x5 mm - hypoechoic, not vascular, stable\par Isthmus .3  cm\par Impression\par Right upper pole nodule - slowly increasing in size, repeat sono 4 months\par stable Right mid pole and Left upper pole nodules\par \par Thyroid Ultrasound Report 11/14/19 \par Comparison: Report dated 7/11/19. \par Findings: \par Right thyroid lobe  4.9x2.2x2.2   cm - mildly heterogeneous\par Right upper pole nodule 9x5x6 mm, stable\par Right mid pole nodule [id number] mm with 1 mm coarse calcification, scant vascularity, stable, benign FNA in 2018\par Left Thyroid lobe  5.2x2.8x2.9   cm - mildly heterogeneous\par Left upper pole nodule 7x6x5 mm - hypoechoic, stable\par Isthmus  .3 cm\par Impression\par stable bilateral thyroid nodules\par largest Right mid pole thyroid nodule previously biopsied and reported as benign\par repeat thyroid sonogram 1 year  \par \par Thyroid Ultrasound Report 1/21/21 \par Comparison: Report dated 11/14/19. \par Findings: \par Right thyroid lobe  5.1x2.4x2.3   cm -enlarged, homogeneous gland\par Right upper pole nodule 9x6x6 mm - hypoechoic, not vascular, stable\par Right mid pole nodule 12x7 mm with 1 mm coarse calcification - vascular, stable, benign FNA in 2018\par Left Thyroid lobe  5.9x2.7x2.5   cm - enlarged, homogeneous gland\par Left  upper pole nodule 8x6x6 mm - stable, hypoechoic\par Left Lower pole area [id number] mm - heterogeneous are vs nodule\par Isthmus  .4 cm\par Impression\par stable bilateral thyroid nodule\par possible new Left lower pole nodule vs patchy area of heterogeneous tissue\par repeat sonogram 4 months \par =======================================================\par labs 9/14/18\par Cr 0.95\par GFR 60\par neg urine microalb/Cr 20\par LDL chol 37, HDl 51\par TSh 2.10 Ft4 1.1, FT3 2.8\par B12 1064\par \par Labs 4/5/19\par Gluc 115, A1c 6.4%\par Cr 0.86, GFR 68\par urine microalb/Cr 39\par LDL 48, HDL 52, Tg 104\par TSH 1.43, FT4 1.0\par A1c 6.5%\par Vit D 31\par \par Labs 1/4/19\par Cr 0.95, GFR 60\par urine microalb/Cr 34\par LDl chol 54, Tg 124\par TSh 2.03, FT4 1.2\par A1c 6.3%\par \par Labs 7/12/19\par A1c 7.4%\par urine microalb/Cr 29\par LDL 50, HDL 41, Tg 143\par TSH 0.57, FT4 1.1, Ft3 3.0\par \par Lans 10/4/19\par Gluc 137, A1c 6.5%\par Cr 0.89, GFR 65\par LDL 39, HDL 43, Tg 93\par TSH 0.15, FT4 1.2, FT3 3.6\par \par Labs 1/10/20\par Gluc 121, A1c 7.1\par Cr 0.85, GFR 68\par urine microalb/Cr 67\par LDL 46, HDL 57, Tg 78\par TSH 0.91, FT4 0.9\par mild anemia\par B12 707\par \par Labs 11/4/20\par urine alb/Cr 39\par LDL 48, HDL 52, Tg 102\par Gluc 139, A1c 6.8\par Cr 0.90, GFR 63\par TSH 2.38, Ft4 1.1\par Hct 33.1\par \par Labs 2/4/21\par urine alb/Cr 52\par LDL 48, HDL 41, Tg 121\par GLuc 164, A1c 7.9\par TSH 2.14\par Cr 0.91, GFR 63\par Hb 10.7, Hct 33.3\par B12 772\par \par Labs 5/12/21\par HDL 46, LDL 48, Tg 96\par Gluc 121, A1c 7.4\par Cr 0.85, GFR 68\par TSH 2.53, FT4 1.0. FT3 2.9

## 2021-05-27 NOTE — ASSESSMENT
[FreeTextEntry1] : 1. 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 decreased carb and portion sizes\par - repeat A1c 3 months\par - f/u cardio\par - f/u vascular as needed, if sx develop\par - cont Lisinopril \par - f/u retina\par - counseled pt on stress reduction\par - counseled pt in importance of regular glucoses checks 1-2x daily to monitor sugars and test for lows, dangerous to take DM meds, petra insulin, without testing FS. Hypoglycemic risks/hypoglycemia unawareness discussed with pt\par \par 2. thyroid nodules - worsening on recent sono, repeat thyroid sono to monitor new area of concern\par \par 3. Hyperthyroid - euthyroid- cont MMi 5 mg daily, repeat TFTs 1 week before next visit\par \par 4. Hyperlipidemia - stable, cont statin\par \par 5. anemia, chronic by history - advised heme eval\par \par 6. voice hoarseness - ENT eval\par \par \par

## 2021-05-27 NOTE — HISTORY OF PRESENT ILLNESS
[FreeTextEntry1] : Interval Hx -needs breast biopsy for abnormal mammo\par \par 1. T2DM\par Quality: Type 2\par Severity: moderate\par Duration: 20+ years\par Onset: blood test\par \par ASSOCIATED SYMPTOMS/COMPLICATIONS:\par 5/2021 eye exam no DR\par (+) microalbuminuria on ACEi\par (-) neuropathy\par 2015 cardiac cath - nonobstructive disease, follows with cardio - stress test/PET/Holter 2021 - results pending\par (+) PAD -  asymptomatic\par \par MODIFYING FACTORS: worse due to diet, stress eating\par Current DM meds:\par Lantus 12 units at HS (using vials)\par Glimepiride 4 mg daily\par Januvia 100 mg daily\par  mg 2 tabs BID\par \par SMBG:not testing. denies hypoglycemic symptoms\par -------------------------------------------------------------\par 2. 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  (+) palpitations and restarted MMI 10/2019 5 mg daily. denies changes in bowel habits. denies weight changes. \par --------------------------------------------------------------\par 3. Thyroid nodules Dx 2011 with benign FNA - denies anterior neck pain, dysphagia. (+) occasional raspiness when stretching neck\par --------------------------------------------------------------------------\par 4. HLD - on Lipitor 80 mg daily by cardio\par \par \par \par \par \par

## 2021-06-08 ENCOUNTER — RX RENEWAL (OUTPATIENT)
Age: 76
End: 2021-06-08

## 2021-06-10 ENCOUNTER — APPOINTMENT (OUTPATIENT)
Dept: ENDOCRINOLOGY | Facility: CLINIC | Age: 76
End: 2021-06-10
Payer: MEDICARE

## 2021-06-10 PROCEDURE — 76536 US EXAM OF HEAD AND NECK: CPT

## 2021-06-18 ENCOUNTER — NON-APPOINTMENT (OUTPATIENT)
Age: 76
End: 2021-06-18

## 2021-07-07 ENCOUNTER — RX RENEWAL (OUTPATIENT)
Age: 76
End: 2021-07-07

## 2021-07-21 ENCOUNTER — APPOINTMENT (OUTPATIENT)
Dept: ENDOCRINOLOGY | Facility: CLINIC | Age: 76
End: 2021-07-21

## 2021-08-04 ENCOUNTER — RX RENEWAL (OUTPATIENT)
Age: 76
End: 2021-08-04

## 2021-08-17 LAB
HBA1C MFR BLD HPLC: 7.3
LDLC SERPL DIRECT ASSAY-MCNC: 42

## 2021-08-19 ENCOUNTER — APPOINTMENT (OUTPATIENT)
Dept: ENDOCRINOLOGY | Facility: CLINIC | Age: 76
End: 2021-08-19
Payer: MEDICARE

## 2021-08-19 ENCOUNTER — RESULT CHARGE (OUTPATIENT)
Age: 76
End: 2021-08-19

## 2021-08-19 VITALS
BODY MASS INDEX: 38.28 KG/M2 | OXYGEN SATURATION: 97 % | WEIGHT: 208 LBS | HEART RATE: 65 BPM | DIASTOLIC BLOOD PRESSURE: 80 MMHG | SYSTOLIC BLOOD PRESSURE: 130 MMHG | HEIGHT: 62 IN | TEMPERATURE: 98.4 F

## 2021-08-19 LAB — GLUCOSE BLDC GLUCOMTR-MCNC: 100

## 2021-08-19 PROCEDURE — 99215 OFFICE O/P EST HI 40 MIN: CPT | Mod: 25

## 2021-08-19 PROCEDURE — 82962 GLUCOSE BLOOD TEST: CPT

## 2021-08-19 RX ORDER — METFORMIN HYDROCHLORIDE 500 MG/1
500 TABLET, COATED ORAL
Qty: 360 | Refills: 2 | Status: DISCONTINUED | COMMUNITY
Start: 2019-11-14 | End: 2021-08-19

## 2021-08-19 NOTE — REVIEW OF SYSTEMS
[Palpitations] : palpitations [Shortness Of Breath] : shortness of breath [SOB on Exertion] : shortness of breath on exertion [Nocturia] : nocturia [Anxiety] : anxiety [Stress] : stress [Polyuria] : polyuria [Fatigue] : no fatigue [Recent Weight Gain (___ Lbs)] : no recent weight gain [Recent Weight Loss (___ Lbs)] : no recent weight loss [Blurred Vision] : no blurred vision [Chest Pain] : no chest pain [Nausea] : no nausea [Abdominal Pain] : no abdominal pain [Diarrhea] : no diarrhea [Joint Pain] : no joint pain [Myalgia] : no myalgia  [Pain/Numbness of Digits] : no pain/numbness of digits [Polydipsia] : no polydipsia [FreeTextEntry5] : s

## 2021-08-19 NOTE — ASSESSMENT
[FreeTextEntry1] : 1. 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, CAD, microalbuminuria, PAD, neuropathy  -worsening A1c due to diet, A1c also falsely lower in setting of anemia\par - cont Januvia 100 mg daily\par - change MFN IR to  mg 2 tabs BID\par - cont Glimepiride 4 mg daily\par - cont Lantus 12 units\par - restart diet!! decreased carb and portion sizes\par - repeat A1c 3 months\par - f/u cardio\par - f/u vascular as needed, if sx develop\par - cont Lisinopril \par - f/u retina\par - counseled pt in importance of regular glucoses checks 1-2x daily to monitor sugars and test for lows, dangerous to take DM meds, petra insulin, without testing FS. Hypoglycemic risks/hypoglycemia unawareness discussed with pt\par \par 2. thyroid nodules - worsening on recent sono, RTO thyroid FNA biopsy, pt has FNA instructions\par \par 3. Hyperthyroid - euthyroid- cont MMi 5 mg daily, repeat TFTs 1 week before next visit\par \par 4. Hyperlipidemia - stable, cont statin\par \par \par \par

## 2021-08-19 NOTE — PHYSICAL EXAM
[Alert] : alert [Well Nourished] : well nourished [Obese] : obese [No Acute Distress] : no acute distress [EOMI] : extra ocular movement intact [No LAD] : no lymphadenopathy [Thyroid Not Enlarged] : the thyroid was not enlarged [No Thyroid Nodules] : no palpable thyroid nodules [No Accessory Muscle Use] : no accessory muscle use [Clear to Auscultation] : lungs were clear to auscultation bilaterally [Normal S1, S2] : normal S1 and S2 [Normal Rate] : heart rate was normal [No Edema] : no peripheral edema [Normal Bowel Sounds] : normal bowel sounds [Not Tender] : non-tender [Soft] : abdomen soft [Normal Gait] : normal gait [Cranial Nerves Intact] : cranial nerves 2-12 were intact [Oriented x3] : oriented to person, place, and time [Normal Affect] : the affect was normal [Normal Insight/Judgement] : insight and judgment were intact [Normal Mood] : the mood was normal [Acanthosis Nigricans] : no acanthosis nigricans [Foot Ulcers] : no foot ulcers [2+] : 2+ in the dorsalis pedis [Vibration Dec.] : diminished vibratory sensation at the level of the toes [Position Sense Dec.] : normal position sense at the level of the toes [Diminished Throughout Both Feet] : normal tactile sensation with monofilament testing throughout both feet

## 2021-08-19 NOTE — HISTORY OF PRESENT ILLNESS
[FreeTextEntry1] : Interval Hx - no issues, no changes. now wearing aids\par \par 1. T2DM\par Quality: Type 2\par Severity: moderate\par Duration: 20+ years\par Onset: blood test\par \par ASSOCIATED SYMPTOMS/COMPLICATIONS:\par 5/2021 eye exam no DR\par (+) microalbuminuria on ACEi\par (-) neuropathy\par 2015 cardiac cath - nonobstructive disease, follows with cardio - stress test/PET/Holter 2021 - results pending\par (+) PAD -  asymptomatic\par \par MODIFYING FACTORS: worse due to diet, stress eating\par Current DM meds:\par Lantus 12 units at HS (using vials)\par Glimepiride 4 mg daily\par Januvia 100 mg daily\par  mg 2 tabs BID\par \par SMBG: denies hypoglycemic symptoms. meter reviewed.\par 8/13 131\par 8/14 118\par 8/15 112\par 8/16 153\par 8/17 87\par 8/18 173\par 8/19 113\par -------------------------------------------------------------\par 2. 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  (+) palpitations and restarted MMI 10/2019 5 mg daily. denies changes in bowel habits. denies weight changes. \par --------------------------------------------------------------\par 3. Thyroid nodules Dx 2011 with benign FNA - denies anterior neck pain, dysphagia. (+) occasional raspiness when stretching neck\par --------------------------------------------------------------------------\par 4. HLD - on Lipitor 80 mg daily by cardio\par \par \par \par \par \par

## 2021-08-19 NOTE — DATA REVIEWED
[FreeTextEntry1] : Thyroid FNA 3/16/16 RMP nodule - benign follicular nodule with post hemorrhagic change\par \par Thyroid sono 5/2018 in office - stable thyroid nodules\par \par Thyroid Ultrasound Report 1/31/19 \par Comparison: Report dated 5/17/18. \par Right thyroid lobe  5.9x2.2x2.6   cm - heterogeneous gland\par Right upper pole heterogeneous nodule 7x4 mm - not vascular, new\par Right mid pole nodule 1.0x.7x1.0cm - vascular, (+) microcalcifications, stable, prevous FNA 2016 was benign\par Left Thyroid lobe   6.0x2.8x2.7  cm - heterogeneous gland\par Left upper pole heterogeneous nodule 7x4 mm -stable\par prior Left mid pole calcification not seen on images\par Isthmus .3  cm\par Impression\par new small Right thyroid nodule, stable right and left thyroid nodules.  Largest Right thyroid nodule previously biopsied and reported as benign\par repeat sono 6 months \par \par Thyroid Ultrasound Report 7/11/19 \par Comparison: Report dated 1/31/19. \par Findings: \par Right thyroid lobe  5.4x2.4x2.5   cm, enlarged, heterogeneous\par Right upper pole nodule 9x5x6 mm - increased size, hypoechoic, not vascular\par Right mid pole nodule 7x6x10 mm with coarse calcification 1 mm- hypoechoic, stable, benign FNA in 2016\par Left Thyroid lobe   5.4x2.5x2.7  cm, enlarged, heterogeneous\par Left upper pole nodule 7x5x5 mm - hypoechoic, not vascular, stable\par Isthmus .3  cm\par Impression\par Right upper pole nodule - slowly increasing in size, repeat sono 4 months\par stable Right mid pole and Left upper pole nodules\par \par Thyroid Ultrasound Report 11/14/19 \par Comparison: Report dated 7/11/19. \par Findings: \par Right thyroid lobe  4.9x2.2x2.2   cm - mildly heterogeneous\par Right upper pole nodule 9x5x6 mm, stable\par Right mid pole nodule [id number] mm with 1 mm coarse calcification, scant vascularity, stable, benign FNA in 2018\par Left Thyroid lobe  5.2x2.8x2.9   cm - mildly heterogeneous\par Left upper pole nodule 7x6x5 mm - hypoechoic, stable\par Isthmus  .3 cm\par Impression\par stable bilateral thyroid nodules\par largest Right mid pole thyroid nodule previously biopsied and reported as benign\par repeat thyroid sonogram 1 year  \par \par Thyroid Ultrasound Report 1/21/21 \par Comparison: Report dated 11/14/19. \par Findings: \par Right thyroid lobe  5.1x2.4x2.3   cm -enlarged, homogeneous gland\par Right upper pole nodule 9x6x6 mm - hypoechoic, not vascular, stable\par Right mid pole nodule 12x7 mm with 1 mm coarse calcification - vascular, stable, benign FNA in 2018\par Left Thyroid lobe  5.9x2.7x2.5   cm - enlarged, homogeneous gland\par Left  upper pole nodule 8x6x6 mm - stable, hypoechoic\par Left Lower pole area [id number] mm - heterogeneous are vs nodule\par Isthmus  .4 cm\par Impression\par stable bilateral thyroid nodule\par possible new Left lower pole nodule vs patchy area of heterogeneous tissue\par repeat sonogram 4 months \par \par \par Thyroid Ultrasound Report 6/10/21 \par Comparison: Report dated 1/21/21. \par Findings: \par Right thyroid lobe  5.8x2.2x2.8   cm - homogeneous, enlarged\par Right upper pole nodule 9x6x7 mm - hypoechoic, stable, not vascular\par Right mid pole nodule [id number] mm with 1 mm calcification- heterogeneous, stable, isoechoic, not vascular, benign FNA in 2018\par Left Thyroid lobe 5.2x2.9x2.8    cm - homogeneous, enlarged\par Left upper pole nodule 4x4x4 mm - hypoechoic, not vascular, decreased size\par Left lower pole nodule [id number] mm - appears as nodule on images, heterogeneous, not vascular\par Isthmus .7  cm\par Impression\par stable Right upper pole nodule and stable Right mid pole nodule\par Left upper pole nodule smaller\par Left lower pole nodule appears as discrete nodule on images and meets criteria for FNA biopsy \par =======================================================\par labs 9/14/18\par Cr 0.95\par GFR 60\par neg urine microalb/Cr 20\par LDL chol 37, HDl 51\par TSh 2.10 Ft4 1.1, FT3 2.8\par B12 1064\par \par Labs 4/5/19\par Gluc 115, A1c 6.4%\par Cr 0.86, GFR 68\par urine microalb/Cr 39\par LDL 48, HDL 52, Tg 104\par TSH 1.43, FT4 1.0\par A1c 6.5%\par Vit D 31\par \par Labs 1/4/19\par Cr 0.95, GFR 60\par urine microalb/Cr 34\par LDl chol 54, Tg 124\par TSh 2.03, FT4 1.2\par A1c 6.3%\par \par Labs 7/12/19\par A1c 7.4%\par urine microalb/Cr 29\par LDL 50, HDL 41, Tg 143\par TSH 0.57, FT4 1.1, Ft3 3.0\par \par Lans 10/4/19\par Gluc 137, A1c 6.5%\par Cr 0.89, GFR 65\par LDL 39, HDL 43, Tg 93\par TSH 0.15, FT4 1.2, FT3 3.6\par \par Labs 1/10/20\par Gluc 121, A1c 7.1\par Cr 0.85, GFR 68\par urine microalb/Cr 67\par LDL 46, HDL 57, Tg 78\par TSH 0.91, FT4 0.9\par mild anemia\par B12 707\par \par Labs 11/4/20\par urine alb/Cr 39\par LDL 48, HDL 52, Tg 102\par Gluc 139, A1c 6.8\par Cr 0.90, GFR 63\par TSH 2.38, Ft4 1.1\par Hct 33.1\par \par Labs 2/4/21\par urine alb/Cr 52\par LDL 48, HDL 41, Tg 121\par GLuc 164, A1c 7.9\par TSH 2.14\par Cr 0.91, GFR 63\par Hb 10.7, Hct 33.3\par B12 772\par \par Labs 5/12/21\par HDL 46, LDL 48, Tg 96\par Gluc 121, A1c 7.4\par Cr 0.85, GFR 68\par TSH 2.53, FT4 1.0. FT3 2.9\par \par Labs 8/13/21\par urine alb/Cr 60\par LDL 42, HDL 47, Tg 112\par Gluc 131, A1c 7.3\par Cr 0.97, GFR 58\par TSH 2.65, Ft4 0.9\par Hb 10.5

## 2021-10-05 ENCOUNTER — LABORATORY RESULT (OUTPATIENT)
Age: 76
End: 2021-10-05

## 2021-10-06 ENCOUNTER — APPOINTMENT (OUTPATIENT)
Dept: ENDOCRINOLOGY | Facility: CLINIC | Age: 76
End: 2021-10-06
Payer: MEDICARE

## 2021-10-06 PROCEDURE — 10005 FNA BX W/US GDN 1ST LES: CPT

## 2021-10-06 NOTE — PROCEDURE
[FreeTextEntry1] : Explained to patient risks/benefits of procedure and consent obtained.  All questions answered. Skin prepped with Povidone-Iodine antiseptic .  Ethyl chloride spray used prior to needle insertion.  Ultrasound guidance utilized for needle placement.  \par \par 1. Left lower pole 2.5 cm, heterogeneous Nodule, #2 passes, 27 gauge needle, Thyroseq collected\par \par \par Patient tolerated the procedure well with no immediate complications. There was no bleed on post biopsy scan. Specimen sent to [facility] for analysis.  \par Advised patient to use Tylenol and ice packs PRN and to avoid Aspirin/NSAIDs.  Call office if increased pain/swelling develop.\par

## 2021-10-08 ENCOUNTER — NON-APPOINTMENT (OUTPATIENT)
Age: 76
End: 2021-10-08

## 2021-10-12 ENCOUNTER — RX RENEWAL (OUTPATIENT)
Age: 76
End: 2021-10-12

## 2021-11-18 LAB
HBA1C MFR BLD HPLC: 7.5
LDLC SERPL DIRECT ASSAY-MCNC: 51

## 2021-11-19 ENCOUNTER — RESULT CHARGE (OUTPATIENT)
Age: 76
End: 2021-11-19

## 2021-11-19 ENCOUNTER — APPOINTMENT (OUTPATIENT)
Dept: ENDOCRINOLOGY | Facility: CLINIC | Age: 76
End: 2021-11-19
Payer: MEDICARE

## 2021-11-19 VITALS
DIASTOLIC BLOOD PRESSURE: 60 MMHG | WEIGHT: 204 LBS | SYSTOLIC BLOOD PRESSURE: 120 MMHG | HEART RATE: 70 BPM | TEMPERATURE: 98.1 F | BODY MASS INDEX: 37.54 KG/M2 | HEIGHT: 62 IN | OXYGEN SATURATION: 95 %

## 2021-11-19 LAB — GLUCOSE BLDC GLUCOMTR-MCNC: 113

## 2021-11-19 PROCEDURE — 82962 GLUCOSE BLOOD TEST: CPT

## 2021-11-19 PROCEDURE — 99214 OFFICE O/P EST MOD 30 MIN: CPT | Mod: 25

## 2021-11-19 RX ORDER — GLIMEPIRIDE 4 MG/1
4 TABLET ORAL DAILY
Qty: 90 | Refills: 1 | Status: DISCONTINUED | COMMUNITY
Start: 2019-11-14 | End: 2021-11-19

## 2021-11-19 NOTE — DATA REVIEWED
[FreeTextEntry1] : Thyroid FNA 3/16/16 RMP nodule - benign follicular nodule with post hemorrhagic change\par \par Thyroid sono 5/2018 in office - stable thyroid nodules\par \par Thyroid Ultrasound Report 1/31/19 \par Comparison: Report dated 5/17/18. \par Right thyroid lobe  5.9x2.2x2.6   cm - heterogeneous gland\par Right upper pole heterogeneous nodule 7x4 mm - not vascular, new\par Right mid pole nodule 1.0x.7x1.0cm - vascular, (+) microcalcifications, stable, prevous FNA 2016 was benign\par Left Thyroid lobe   6.0x2.8x2.7  cm - heterogeneous gland\par Left upper pole heterogeneous nodule 7x4 mm -stable\par prior Left mid pole calcification not seen on images\par Isthmus .3  cm\par Impression\par new small Right thyroid nodule, stable right and left thyroid nodules.  Largest Right thyroid nodule previously biopsied and reported as benign\par repeat sono 6 months \par \par Thyroid Ultrasound Report 7/11/19 \par Comparison: Report dated 1/31/19. \par Findings: \par Right thyroid lobe  5.4x2.4x2.5   cm, enlarged, heterogeneous\par Right upper pole nodule 9x5x6 mm - increased size, hypoechoic, not vascular\par Right mid pole nodule 7x6x10 mm with coarse calcification 1 mm- hypoechoic, stable, benign FNA in 2016\par Left Thyroid lobe   5.4x2.5x2.7  cm, enlarged, heterogeneous\par Left upper pole nodule 7x5x5 mm - hypoechoic, not vascular, stable\par Isthmus .3  cm\par Impression\par Right upper pole nodule - slowly increasing in size, repeat sono 4 months\par stable Right mid pole and Left upper pole nodules\par \par Thyroid Ultrasound Report 11/14/19 \par Comparison: Report dated 7/11/19. \par Findings: \par Right thyroid lobe  4.9x2.2x2.2   cm - mildly heterogeneous\par Right upper pole nodule 9x5x6 mm, stable\par Right mid pole nodule [id number] mm with 1 mm coarse calcification, scant vascularity, stable, benign FNA in 2018\par Left Thyroid lobe  5.2x2.8x2.9   cm - mildly heterogeneous\par Left upper pole nodule 7x6x5 mm - hypoechoic, stable\par Isthmus  .3 cm\par Impression\par stable bilateral thyroid nodules\par largest Right mid pole thyroid nodule previously biopsied and reported as benign\par repeat thyroid sonogram 1 year  \par \par Thyroid Ultrasound Report 1/21/21 \par Comparison: Report dated 11/14/19. \par Findings: \par Right thyroid lobe  5.1x2.4x2.3   cm -enlarged, homogeneous gland\par Right upper pole nodule 9x6x6 mm - hypoechoic, not vascular, stable\par Right mid pole nodule 12x7 mm with 1 mm coarse calcification - vascular, stable, benign FNA in 2018\par Left Thyroid lobe  5.9x2.7x2.5   cm - enlarged, homogeneous gland\par Left  upper pole nodule 8x6x6 mm - stable, hypoechoic\par Left Lower pole area [id number] mm - heterogeneous are vs nodule\par Isthmus  .4 cm\par Impression\par stable bilateral thyroid nodule\par possible new Left lower pole nodule vs patchy area of heterogeneous tissue\par repeat sonogram 4 months \par \par \par Thyroid Ultrasound Report 6/10/21 \par Comparison: Report dated 1/21/21. \par Findings: \par Right thyroid lobe  5.8x2.2x2.8   cm - homogeneous, enlarged\par Right upper pole nodule 9x6x7 mm - hypoechoic, stable, not vascular\par Right mid pole nodule [id number] mm with 1 mm calcification- heterogeneous, stable, isoechoic, not vascular, benign FNA in 2018\par Left Thyroid lobe 5.2x2.9x2.8    cm - homogeneous, enlarged\par Left upper pole nodule 4x4x4 mm - hypoechoic, not vascular, decreased size\par Left lower pole nodule [id number] mm - appears as nodule on images, heterogeneous, not vascular\par Isthmus .7  cm\par Impression\par stable Right upper pole nodule and stable Right mid pole nodule\par Left upper pole nodule smaller\par Left lower pole nodule appears as discrete nodule on images and meets criteria for FNA biopsy \par \par Thyroid FNA 10/6/21 Left LP nodule - benign, cat 2, adenomatous nodular goiter\par =======================================================\par labs 9/14/18\par Cr 0.95\par GFR 60\par neg urine microalb/Cr 20\par LDL chol 37, HDl 51\par TSh 2.10 Ft4 1.1, FT3 2.8\par B12 1064\par \par Labs 4/5/19\par Gluc 115, A1c 6.4%\par Cr 0.86, GFR 68\par urine microalb/Cr 39\par LDL 48, HDL 52, Tg 104\par TSH 1.43, FT4 1.0\par A1c 6.5%\par Vit D 31\par \par Labs 1/4/19\par Cr 0.95, GFR 60\par urine microalb/Cr 34\par LDl chol 54, Tg 124\par TSh 2.03, FT4 1.2\par A1c 6.3%\par \par Labs 7/12/19\par A1c 7.4%\par urine microalb/Cr 29\par LDL 50, HDL 41, Tg 143\par TSH 0.57, FT4 1.1, Ft3 3.0\par \par Lans 10/4/19\par Gluc 137, A1c 6.5%\par Cr 0.89, GFR 65\par LDL 39, HDL 43, Tg 93\par TSH 0.15, FT4 1.2, FT3 3.6\par \par Labs 1/10/20\par Gluc 121, A1c 7.1\par Cr 0.85, GFR 68\par urine microalb/Cr 67\par LDL 46, HDL 57, Tg 78\par TSH 0.91, FT4 0.9\par mild anemia\par B12 707\par \par Labs 11/4/20\par urine alb/Cr 39\par LDL 48, HDL 52, Tg 102\par Gluc 139, A1c 6.8\par Cr 0.90, GFR 63\par TSH 2.38, Ft4 1.1\par Hct 33.1\par \par Labs 2/4/21\par urine alb/Cr 52\par LDL 48, HDL 41, Tg 121\par GLuc 164, A1c 7.9\par TSH 2.14\par Cr 0.91, GFR 63\par Hb 10.7, Hct 33.3\par B12 772\par \par Labs 5/12/21\par HDL 46, LDL 48, Tg 96\par Gluc 121, A1c 7.4\par Cr 0.85, GFR 68\par TSH 2.53, FT4 1.0. FT3 2.9\par \par Labs 8/13/21\par urine alb/Cr 60\par LDL 42, HDL 47, Tg 112\par Gluc 131, A1c 7.3\par Cr 0.97, GFR 58\par TSH 2.65, Ft4 0.9\par Hb 10.5\par \par Labs 11/10/21\par LDL 51, HDL 51, Tg 115\par Gluc 167, A1c 7.5\par Cr 0.87, GFR 66\par TSH 2.39, FT4 1.0, FT3 3.1\par Hb 11

## 2021-11-19 NOTE — ASSESSMENT
[FreeTextEntry1] : 1. 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, CAD, microalbuminuria, PAD, neuropathy  -worsening A1c due to diet, A1c also falsely lower in setting of anemia, pt reports recent increase hypoglycemia\par - cont Januvia 100 mg daily\par - cont MFN  mg 2 tabs BID\par - stop Glimepiride 4 mg daily, try Jardiance 25 mg daily - risks/benefits discusssed\par - cont Lantus 12 units\par - repeat A1c 3 months\par - f/u cardio\par - f/u vascular as needed, if sx develop\par - cont Lisinopril \par - f/u retina\par - counseled pt in importance of regular glucoses checks 1-2x daily to monitor sugars and test for lows, dangerous to take DM meds, petra insulin, without testing FS. Hypoglycemic risks/hypoglycemia unawareness discussed with pt\par \par 2. thyroid nodules - recent benign FNA thyroid biopsy, repeat sono 2022 to monitor size and appearance of nodules\par \par 3. Hyperthyroid - euthyroid- cont MMi 5 mg daily, repeat TFTs 1 week before next visit. monitor CBC and CMP\par \par 4. Hyperlipidemia - stable, cont statin\par \par \par \par

## 2021-11-19 NOTE — REVIEW OF SYSTEMS
[Palpitations] : palpitations [Shortness Of Breath] : shortness of breath [SOB on Exertion] : shortness of breath on exertion [Polyuria] : polyuria [Nocturia] : nocturia [Anxiety] : anxiety [Stress] : stress [Fatigue] : no fatigue [Recent Weight Gain (___ Lbs)] : no recent weight gain [Recent Weight Loss (___ Lbs)] : no recent weight loss [Blurred Vision] : no blurred vision [Chest Pain] : no chest pain [Nausea] : no nausea [Abdominal Pain] : no abdominal pain [Diarrhea] : no diarrhea [Joint Pain] : no joint pain [Myalgia] : no myalgia  [Pain/Numbness of Digits] : no pain/numbness of digits [Polydipsia] : no polydipsia [FreeTextEntry5] : stable, following w cardio to have holter and echo

## 2021-11-19 NOTE — PHYSICAL EXAM
[Alert] : alert [Well Nourished] : well nourished [Obese] : obese [No Acute Distress] : no acute distress [EOMI] : extra ocular movement intact [No LAD] : no lymphadenopathy [Thyroid Not Enlarged] : the thyroid was not enlarged [No Thyroid Nodules] : no palpable thyroid nodules [No Accessory Muscle Use] : no accessory muscle use [Clear to Auscultation] : lungs were clear to auscultation bilaterally [Normal S1, S2] : normal S1 and S2 [Normal Rate] : heart rate was normal [No Edema] : no peripheral edema [Normal Bowel Sounds] : normal bowel sounds [Not Tender] : non-tender [Soft] : abdomen soft [Normal Gait] : normal gait [Oriented x3] : oriented to person, place, and time [Normal Affect] : the affect was normal [Normal Insight/Judgement] : insight and judgment were intact [Normal Mood] : the mood was normal [Acanthosis Nigricans] : no acanthosis nigricans

## 2021-11-19 NOTE — HISTORY OF PRESENT ILLNESS
[FreeTextEntry1] : Interval Hx - to have breast biopsy next week\par \par 1. T2DM\par Quality: Type 2\par Severity: moderate\par Duration: 20+ years\par Onset: blood test\par \par ASSOCIATED SYMPTOMS/COMPLICATIONS:\par 5/2021 eye exam no DR\par (+) microalbuminuria on ACEi\par (-) neuropathy\par 2015 cardiac cath - nonobstructive disease, follows with cardio - stress test/PET/Holter 2021 - results pending\par (+) PAD -  asymptomatic\par \par MODIFYING FACTORS: worse due to diet, stress eating\par Current DM meds:\par Lantus 12 units at HS (using vials)\par Glimepiride 4 mg daily\par Januvia 100 mg daily\par  mg 2 tabs BID\par \par SMBG: denies hypoglycemic symptoms. meter reviewed. lowest FS 59, 64 and 77 (daytime) w sx\par 30 day avg 133, 14 day avg 140\par \par -------------------------------------------------------------\par 2. 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  (+) palpitations and restarted MMI 10/2019 5 mg daily. denies changes in bowel habits. denies weight changes. \par --------------------------------------------------------------\par 3. Thyroid nodules Dx 2011 with benign FNA - denies anterior neck pain, dysphagia. (+) occasional raspiness when stretching neck\par --------------------------------------------------------------------------\par 4. HLD - on Lipitor 80 mg daily by cardio\par \par \par \par \par \par

## 2021-11-22 ENCOUNTER — RESULT REVIEW (OUTPATIENT)
Age: 76
End: 2021-11-22

## 2022-01-13 ENCOUNTER — RX RENEWAL (OUTPATIENT)
Age: 77
End: 2022-01-13

## 2022-03-03 LAB
HBA1C MFR BLD HPLC: 8.5
LDLC SERPL DIRECT ASSAY-MCNC: 39
MICROALBUMIN/CREAT 24H UR-RTO: 19

## 2022-03-04 ENCOUNTER — RESULT CHARGE (OUTPATIENT)
Age: 77
End: 2022-03-04

## 2022-03-04 ENCOUNTER — APPOINTMENT (OUTPATIENT)
Dept: ENDOCRINOLOGY | Facility: CLINIC | Age: 77
End: 2022-03-04
Payer: MEDICARE

## 2022-03-04 VITALS
BODY MASS INDEX: 35.7 KG/M2 | HEART RATE: 64 BPM | SYSTOLIC BLOOD PRESSURE: 124 MMHG | DIASTOLIC BLOOD PRESSURE: 60 MMHG | HEIGHT: 62 IN | WEIGHT: 194 LBS

## 2022-03-04 LAB — GLUCOSE BLDC GLUCOMTR-MCNC: 223

## 2022-03-04 PROCEDURE — 99214 OFFICE O/P EST MOD 30 MIN: CPT | Mod: 25

## 2022-03-04 PROCEDURE — 82962 GLUCOSE BLOOD TEST: CPT

## 2022-03-04 NOTE — PHYSICAL EXAM
[Alert] : alert [Healthy Appearance] : healthy appearance [No LAD] : no lymphadenopathy [EOMI] : extra ocular movement intact [No Accessory Muscle Use] : no accessory muscle use [Clear to Auscultation] : lungs were clear to auscultation bilaterally [Normal S1, S2] : normal S1 and S2 [Normal Rate] : heart rate was normal [No Edema] : no peripheral edema [Not Tender] : non-tender [Soft] : abdomen soft [Oriented x3] : oriented to person, place, and time [Normal Affect] : the affect was normal [Normal Insight/Judgement] : insight and judgment were intact [Normal Mood] : the mood was normal [de-identified] : thyroid nodule

## 2022-03-04 NOTE — REVIEW OF SYSTEMS
[Palpitations] : palpitations [Shortness Of Breath] : shortness of breath [SOB on Exertion] : shortness of breath on exertion [Polyuria] : polyuria [Nocturia] : nocturia [Anxiety] : anxiety [Stress] : stress [Recent Weight Loss (___ Lbs)] : recent weight loss: [unfilled] lbs [Fatigue] : no fatigue [Blurred Vision] : no blurred vision [Chest Pain] : no chest pain [Nausea] : no nausea [Abdominal Pain] : no abdominal pain [Diarrhea] : no diarrhea [Joint Pain] : no joint pain [Myalgia] : no myalgia  [Pain/Numbness of Digits] : no pain/numbness of digits [Polydipsia] : no polydipsia [FreeTextEntry5] : stable, following w cardio, testing was okay

## 2022-03-04 NOTE — HISTORY OF PRESENT ILLNESS
[FreeTextEntry1] : Interval Hx -increased stress at home, worsening sugars, snacking after dinner\par \par 1. T2DM\par Quality: Type 2\par Severity: moderate\par Duration: 20+ years\par Onset: blood test\par \par ASSOCIATED SYMPTOMS/COMPLICATIONS:\par 5/2021 eye exam no DR\par (+) albuminuria on ACEi\par (-) neuropathy\par 2015 cardiac cath - nonobstructive disease\par (+) PAD -  asymptomatic\par \par MODIFYING FACTORS: worse due to diet, stress eating\par Current DM meds:\par Lantus 12 units at HS (using vials)\par Januvia 100 mg daily\par Jardiance 25 mg daily\par  mg 2 tabs BID\par \par SMBG: denies hypoglycemic symptoms. meter reviewed. -200's in morning\par \par -------------------------------------------------------------\par 2. 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  (+) palpitations and restarted MMI 10/2019 5 mg daily. denies changes in bowel habits. denies weight changes. \par --------------------------------------------------------------\par 3. Thyroid nodules Dx 2011 with benign FNA - denies anterior neck pain, dysphagia. (+) occasional raspiness when stretching neck\par --------------------------------------------------------------------------\par 4. HLD - on Lipitor 80 mg daily by cardio\par \par \par \par \par \par

## 2022-03-04 NOTE — ASSESSMENT
[FreeTextEntry1] : 1. 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 thyroid nodules - recent benign FNA thyroid biopsy, repeat sono 2022 to monitor size and appearance of nodules\par \par 3. Hyperthyroid - euthyroid, normal TSH\par - cont MMi 5 mg daily, repeat TFTs 1 week before next visit. monitor CBC and CMP\par \par 4. Hyperlipidemia - stable, cont statin\par \par \par \par

## 2022-03-22 ENCOUNTER — NON-APPOINTMENT (OUTPATIENT)
Age: 77
End: 2022-03-22

## 2022-04-05 ENCOUNTER — RX RENEWAL (OUTPATIENT)
Age: 77
End: 2022-04-05

## 2022-04-15 ENCOUNTER — NON-APPOINTMENT (OUTPATIENT)
Age: 77
End: 2022-04-15

## 2022-05-06 ENCOUNTER — NON-APPOINTMENT (OUTPATIENT)
Age: 77
End: 2022-05-06

## 2022-05-19 ENCOUNTER — NON-APPOINTMENT (OUTPATIENT)
Age: 77
End: 2022-05-19

## 2022-05-19 DIAGNOSIS — B37.3 CANDIDIASIS OF VULVA AND VAGINA: ICD-10-CM

## 2022-06-03 LAB
HBA1C MFR BLD HPLC: 8.4
LDLC SERPL DIRECT ASSAY-MCNC: 45

## 2022-06-08 ENCOUNTER — APPOINTMENT (OUTPATIENT)
Dept: ENDOCRINOLOGY | Facility: CLINIC | Age: 77
End: 2022-06-08
Payer: MEDICARE

## 2022-06-08 VITALS
WEIGHT: 188 LBS | OXYGEN SATURATION: 98 % | HEIGHT: 62 IN | DIASTOLIC BLOOD PRESSURE: 72 MMHG | SYSTOLIC BLOOD PRESSURE: 116 MMHG | HEART RATE: 68 BPM | BODY MASS INDEX: 34.6 KG/M2

## 2022-06-08 DIAGNOSIS — Z87.39 PERSONAL HISTORY OF OTHER DISEASES OF THE MUSCULOSKELETAL SYSTEM AND CONNECTIVE TISSUE: ICD-10-CM

## 2022-06-08 LAB — GLUCOSE BLDC GLUCOMTR-MCNC: 175

## 2022-06-08 PROCEDURE — 99214 OFFICE O/P EST MOD 30 MIN: CPT | Mod: 25

## 2022-06-08 PROCEDURE — 82962 GLUCOSE BLOOD TEST: CPT

## 2022-06-08 RX ORDER — FLUCONAZOLE 150 MG/1
150 TABLET ORAL
Qty: 1 | Refills: 0 | Status: DISCONTINUED | COMMUNITY
Start: 2022-05-19 | End: 2022-06-08

## 2022-06-08 RX ORDER — SITAGLIPTIN 100 MG/1
100 TABLET, FILM COATED ORAL
Qty: 90 | Refills: 2 | Status: DISCONTINUED | COMMUNITY
Start: 2020-04-27 | End: 2022-06-08

## 2022-06-08 RX ORDER — EMPAGLIFLOZIN 25 MG/1
25 TABLET, FILM COATED ORAL DAILY
Qty: 90 | Refills: 3 | Status: DISCONTINUED | COMMUNITY
Start: 2021-11-19 | End: 2022-06-08

## 2022-06-08 NOTE — PHYSICAL EXAM
[Alert] : alert [Obese] : obese [No Acute Distress] : no acute distress [EOMI] : extra ocular movement intact [No Accessory Muscle Use] : no accessory muscle use [Clear to Auscultation] : lungs were clear to auscultation bilaterally [Normal S1, S2] : normal S1 and S2 [Normal Rate] : heart rate was normal [No Edema] : no peripheral edema [Not Tender] : non-tender [Soft] : abdomen soft [Foot Ulcers] : no foot ulcers [2+] : 2+ in the dorsalis pedis [Vibration Dec.] : normal vibratory sensation at the level of the toes [Diminished Throughout Both Feet] : normal tactile sensation with monofilament testing throughout both feet [Oriented x3] : oriented to person, place, and time [Normal Affect] : the affect was normal [Normal Insight/Judgement] : insight and judgment were intact [Normal Mood] : the mood was normal [de-identified] : decreased pulses Left foot

## 2022-06-08 NOTE — HISTORY OF PRESENT ILLNESS
[FreeTextEntry1] : Interval Hx - increased stress at home with husbands medical issues.  still has yeast infection despite fluconazole\par \par 1. T2DM\par Quality: Type 2\par Severity: moderate\par Duration: 20+ years\par Onset: blood test\par \par ASSOCIATED SYMPTOMS/COMPLICATIONS:\par 5/2021 eye exam no DR\par (+) albuminuria on ACEi\par (-) neuropathy\par 2015 cardiac cath - nonobstructive disease\par (+) PAD -  asymptomatic\par \par MODIFYING FACTORS: worse due to diet, stress eating\par Current DM meds:\par Lantus 24 units at HS (using vials)\par Januvia 100 mg daily\par Jardiance 25 mg daily\par  mg 2 tabs BID\par \par SMBG: denies hypoglycemic symptoms. meter reviewed. FS 's\par -------------------------------------------------------------\par 2. 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  (+) palpitations and restarted MMI 10/2019 5 mg daily. denies changes in bowel habits. denies weight changes. \par --------------------------------------------------------------\par 3. Thyroid nodules Dx 2011 with benign FNA - denies anterior neck pain, dysphagia. (+) occasional raspiness when stretching neck\par --------------------------------------------------------------------------\par 4. HLD - on Lipitor 80 mg daily by cardio\par \par \par \par \par \par

## 2022-06-08 NOTE — ASSESSMENT
[FreeTextEntry1] : 1. 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 thyroid nodules - recent benign FNA thyroid biopsy, repeat sono 2022 to monitor size and appearance of nodules\par \par 3. Hyperthyroid - euthyroid, normal TSH\par - cont MMi 5 mg daily, repeat TFTs 1 week before next visit. monitor CBC and CMP\par \par 4. Hyperlipidemia - stable, cont statin\par \par \par \par  [Diabetes Foot Care] : diabetes foot care

## 2022-06-08 NOTE — DATA REVIEWED
[FreeTextEntry1] : Thyroid FNA 3/16/16 RMP nodule - benign follicular nodule with post hemorrhagic change\par \par Thyroid sono 5/2018 in office - stable thyroid nodules\par \par Thyroid Ultrasound Report 1/31/19 \par Comparison: Report dated 5/17/18. \par Right thyroid lobe  5.9x2.2x2.6   cm - heterogeneous gland\par Right upper pole heterogeneous nodule 7x4 mm - not vascular, new\par Right mid pole nodule 1.0x.7x1.0cm - vascular, (+) microcalcifications, stable, prevous FNA 2016 was benign\par Left Thyroid lobe   6.0x2.8x2.7  cm - heterogeneous gland\par Left upper pole heterogeneous nodule 7x4 mm -stable\par prior Left mid pole calcification not seen on images\par Isthmus .3  cm\par Impression\par new small Right thyroid nodule, stable right and left thyroid nodules.  Largest Right thyroid nodule previously biopsied and reported as benign\par repeat sono 6 months \par \par Thyroid Ultrasound Report 7/11/19 \par Comparison: Report dated 1/31/19. \par Findings: \par Right thyroid lobe  5.4x2.4x2.5   cm, enlarged, heterogeneous\par Right upper pole nodule 9x5x6 mm - increased size, hypoechoic, not vascular\par Right mid pole nodule 7x6x10 mm with coarse calcification 1 mm- hypoechoic, stable, benign FNA in 2016\par Left Thyroid lobe   5.4x2.5x2.7  cm, enlarged, heterogeneous\par Left upper pole nodule 7x5x5 mm - hypoechoic, not vascular, stable\par Isthmus .3  cm\par Impression\par Right upper pole nodule - slowly increasing in size, repeat sono 4 months\par stable Right mid pole and Left upper pole nodules\par \par Thyroid Ultrasound Report 11/14/19 \par Comparison: Report dated 7/11/19. \par Findings: \par Right thyroid lobe  4.9x2.2x2.2   cm - mildly heterogeneous\par Right upper pole nodule 9x5x6 mm, stable\par Right mid pole nodule [id number] mm with 1 mm coarse calcification, scant vascularity, stable, benign FNA in 2018\par Left Thyroid lobe  5.2x2.8x2.9   cm - mildly heterogeneous\par Left upper pole nodule 7x6x5 mm - hypoechoic, stable\par Isthmus  .3 cm\par Impression\par stable bilateral thyroid nodules\par largest Right mid pole thyroid nodule previously biopsied and reported as benign\par repeat thyroid sonogram 1 year  \par \par Thyroid Ultrasound Report 1/21/21 \par Comparison: Report dated 11/14/19. \par Findings: \par Right thyroid lobe  5.1x2.4x2.3   cm -enlarged, homogeneous gland\par Right upper pole nodule 9x6x6 mm - hypoechoic, not vascular, stable\par Right mid pole nodule 12x7 mm with 1 mm coarse calcification - vascular, stable, benign FNA in 2018\par Left Thyroid lobe  5.9x2.7x2.5   cm - enlarged, homogeneous gland\par Left  upper pole nodule 8x6x6 mm - stable, hypoechoic\par Left Lower pole area [id number] mm - heterogeneous are vs nodule\par Isthmus  .4 cm\par Impression\par stable bilateral thyroid nodule\par possible new Left lower pole nodule vs patchy area of heterogeneous tissue\par repeat sonogram 4 months \par \par \par Thyroid Ultrasound Report 6/10/21 \par Comparison: Report dated 1/21/21. \par Findings: \par Right thyroid lobe  5.8x2.2x2.8   cm - homogeneous, enlarged\par Right upper pole nodule 9x6x7 mm - hypoechoic, stable, not vascular\par Right mid pole nodule [id number] mm with 1 mm calcification- heterogeneous, stable, isoechoic, not vascular, benign FNA in 2018\par Left Thyroid lobe 5.2x2.9x2.8    cm - homogeneous, enlarged\par Left upper pole nodule 4x4x4 mm - hypoechoic, not vascular, decreased size\par Left lower pole nodule [id number] mm - appears as nodule on images, heterogeneous, not vascular\par Isthmus .7  cm\par Impression\par stable Right upper pole nodule and stable Right mid pole nodule\par Left upper pole nodule smaller\par Left lower pole nodule appears as discrete nodule on images and meets criteria for FNA biopsy \par \par Thyroid FNA 10/6/21 Left LP nodule - benign, cat 2, adenomatous nodular goiter\par =======================================================\par labs 9/14/18\par Cr 0.95\par GFR 60\par neg urine microalb/Cr 20\par LDL chol 37, HDl 51\par TSh 2.10 Ft4 1.1, FT3 2.8\par B12 1064\par \par Labs 4/5/19\par Gluc 115, A1c 6.4%\par Cr 0.86, GFR 68\par urine microalb/Cr 39\par LDL 48, HDL 52, Tg 104\par TSH 1.43, FT4 1.0\par A1c 6.5%\par Vit D 31\par \par Labs 1/4/19\par Cr 0.95, GFR 60\par urine microalb/Cr 34\par LDl chol 54, Tg 124\par TSh 2.03, FT4 1.2\par A1c 6.3%\par \par Labs 7/12/19\par A1c 7.4%\par urine microalb/Cr 29\par LDL 50, HDL 41, Tg 143\par TSH 0.57, FT4 1.1, Ft3 3.0\par \par Lans 10/4/19\par Gluc 137, A1c 6.5%\par Cr 0.89, GFR 65\par LDL 39, HDL 43, Tg 93\par TSH 0.15, FT4 1.2, FT3 3.6\par \par Labs 1/10/20\par Gluc 121, A1c 7.1\par Cr 0.85, GFR 68\par urine microalb/Cr 67\par LDL 46, HDL 57, Tg 78\par TSH 0.91, FT4 0.9\par mild anemia\par B12 707\par \par Labs 11/4/20\par urine alb/Cr 39\par LDL 48, HDL 52, Tg 102\par Gluc 139, A1c 6.8\par Cr 0.90, GFR 63\par TSH 2.38, Ft4 1.1\par Hct 33.1\par \par Labs 2/4/21\par urine alb/Cr 52\par LDL 48, HDL 41, Tg 121\par GLuc 164, A1c 7.9\par TSH 2.14\par Cr 0.91, GFR 63\par Hb 10.7, Hct 33.3\par B12 772\par \par Labs 5/12/21\par HDL 46, LDL 48, Tg 96\par Gluc 121, A1c 7.4\par Cr 0.85, GFR 68\par TSH 2.53, FT4 1.0. FT3 2.9\par \par Labs 8/13/21\par urine alb/Cr 60\par LDL 42, HDL 47, Tg 112\par Gluc 131, A1c 7.3\par Cr 0.97, GFR 58\par TSH 2.65, Ft4 0.9\par Hb 10.5\par \par Labs 11/10/21\par LDL 51, HDL 51, Tg 115\par Gluc 167, A1c 7.5\par Cr 0.87, GFR 66\par TSH 2.39, FT4 1.0, FT3 3.1\par Hb 11\par \par Labs 2/25/22\par urine alb/Cr 19\par LDL 39, HDL 41, Tg 147\par Gluc 162, A1c 8.5\par Cr 1.04, GFR 53\par TSH 1.91\par \par labs 6/1/22\par HDL 44, LDL 45, Tg 105\par Gluc 131, A1c 8.4\par Cr 0.98, GFR 57\par TSH 1.48, FT4 1.0, FT3 2.9

## 2022-06-08 NOTE — REVIEW OF SYSTEMS
[Recent Weight Loss (___ Lbs)] : recent weight loss: [unfilled] lbs [SOB on Exertion] : shortness of breath on exertion [As Noted in HPI] : as noted in HPI [Polyuria] : polyuria [Nocturia] : nocturia [Anxiety] : anxiety [Stress] : stress [Blurred Vision] : no blurred vision [Chest Pain] : no chest pain [Shortness Of Breath] : no shortness of breath [Nausea] : no nausea [Abdominal Pain] : no abdominal pain [Pain/Numbness of Digits] : no pain/numbness of digits [Depression] : no depression [Polydipsia] : no polydipsia

## 2022-06-29 ENCOUNTER — RX RENEWAL (OUTPATIENT)
Age: 77
End: 2022-06-29

## 2022-07-07 ENCOUNTER — APPOINTMENT (OUTPATIENT)
Dept: ENDOCRINOLOGY | Facility: CLINIC | Age: 77
End: 2022-07-07

## 2022-11-23 LAB
HBA1C MFR BLD HPLC: 6.6
LDLC SERPL DIRECT ASSAY-MCNC: 38

## 2022-11-26 ENCOUNTER — APPOINTMENT (OUTPATIENT)
Dept: ENDOCRINOLOGY | Facility: CLINIC | Age: 77
End: 2022-11-26

## 2022-11-26 VITALS
HEIGHT: 62 IN | BODY MASS INDEX: 34.23 KG/M2 | HEART RATE: 77 BPM | DIASTOLIC BLOOD PRESSURE: 70 MMHG | SYSTOLIC BLOOD PRESSURE: 120 MMHG | WEIGHT: 186 LBS | OXYGEN SATURATION: 98 %

## 2022-11-26 LAB — GLUCOSE BLDC GLUCOMTR-MCNC: 189

## 2022-11-26 PROCEDURE — 99214 OFFICE O/P EST MOD 30 MIN: CPT | Mod: 25

## 2022-11-26 PROCEDURE — 82962 GLUCOSE BLOOD TEST: CPT

## 2022-11-26 RX ORDER — TEMAZEPAM 15 MG/1
15 CAPSULE ORAL
Qty: 30 | Refills: 0 | Status: DISCONTINUED | COMMUNITY
Start: 2022-05-27 | End: 2022-11-26

## 2022-11-26 NOTE — DATA REVIEWED
[FreeTextEntry1] : Thyroid FNA 3/16/16 RMP nodule - benign follicular nodule with post hemorrhagic change\par \par Thyroid sono 5/2018 in office - stable thyroid nodules\par \par Thyroid Ultrasound Report 1/31/19 \par Comparison: Report dated 5/17/18. \par Right thyroid lobe  5.9x2.2x2.6   cm - heterogeneous gland\par Right upper pole heterogeneous nodule 7x4 mm - not vascular, new\par Right mid pole nodule 1.0x.7x1.0cm - vascular, (+) microcalcifications, stable, prevous FNA 2016 was benign\par Left Thyroid lobe   6.0x2.8x2.7  cm - heterogeneous gland\par Left upper pole heterogeneous nodule 7x4 mm -stable\par prior Left mid pole calcification not seen on images\par Isthmus .3  cm\par Impression\par new small Right thyroid nodule, stable right and left thyroid nodules.  Largest Right thyroid nodule previously biopsied and reported as benign\par repeat sono 6 months \par \par Thyroid Ultrasound Report 7/11/19 \par Comparison: Report dated 1/31/19. \par Findings: \par Right thyroid lobe  5.4x2.4x2.5   cm, enlarged, heterogeneous\par Right upper pole nodule 9x5x6 mm - increased size, hypoechoic, not vascular\par Right mid pole nodule 7x6x10 mm with coarse calcification 1 mm- hypoechoic, stable, benign FNA in 2016\par Left Thyroid lobe   5.4x2.5x2.7  cm, enlarged, heterogeneous\par Left upper pole nodule 7x5x5 mm - hypoechoic, not vascular, stable\par Isthmus .3  cm\par Impression\par Right upper pole nodule - slowly increasing in size, repeat sono 4 months\par stable Right mid pole and Left upper pole nodules\par \par Thyroid Ultrasound Report 11/14/19 \par Comparison: Report dated 7/11/19. \par Findings: \par Right thyroid lobe  4.9x2.2x2.2   cm - mildly heterogeneous\par Right upper pole nodule 9x5x6 mm, stable\par Right mid pole nodule [id number] mm with 1 mm coarse calcification, scant vascularity, stable, benign FNA in 2018\par Left Thyroid lobe  5.2x2.8x2.9   cm - mildly heterogeneous\par Left upper pole nodule 7x6x5 mm - hypoechoic, stable\par Isthmus  .3 cm\par Impression\par stable bilateral thyroid nodules\par largest Right mid pole thyroid nodule previously biopsied and reported as benign\par repeat thyroid sonogram 1 year  \par \par Thyroid Ultrasound Report 1/21/21 \par Comparison: Report dated 11/14/19. \par Findings: \par Right thyroid lobe  5.1x2.4x2.3   cm -enlarged, homogeneous gland\par Right upper pole nodule 9x6x6 mm - hypoechoic, not vascular, stable\par Right mid pole nodule 12x7 mm with 1 mm coarse calcification - vascular, stable, benign FNA in 2018\par Left Thyroid lobe  5.9x2.7x2.5   cm - enlarged, homogeneous gland\par Left  upper pole nodule 8x6x6 mm - stable, hypoechoic\par Left Lower pole area [id number] mm - heterogeneous are vs nodule\par Isthmus  .4 cm\par Impression\par stable bilateral thyroid nodule\par possible new Left lower pole nodule vs patchy area of heterogeneous tissue\par repeat sonogram 4 months \par \par \par Thyroid Ultrasound Report 6/10/21 \par Comparison: Report dated 1/21/21. \par Findings: \par Right thyroid lobe  5.8x2.2x2.8   cm - homogeneous, enlarged\par Right upper pole nodule 9x6x7 mm - hypoechoic, stable, not vascular\par Right mid pole nodule [id number] mm with 1 mm calcification- heterogeneous, stable, isoechoic, not vascular, benign FNA in 2018\par Left Thyroid lobe 5.2x2.9x2.8    cm - homogeneous, enlarged\par Left upper pole nodule 4x4x4 mm - hypoechoic, not vascular, decreased size\par Left lower pole nodule [id number] mm - appears as nodule on images, heterogeneous, not vascular\par Isthmus .7  cm\par Impression\par stable Right upper pole nodule and stable Right mid pole nodule\par Left upper pole nodule smaller\par Left lower pole nodule appears as discrete nodule on images and meets criteria for FNA biopsy \par \par Thyroid FNA 10/6/21 Left LP nodule - benign, cat 2, adenomatous nodular goiter\par =======================================================\par labs 9/14/18\par Cr 0.95\par GFR 60\par neg urine microalb/Cr 20\par LDL chol 37, HDl 51\par TSh 2.10 Ft4 1.1, FT3 2.8\par B12 1064\par \par Labs 4/5/19\par Gluc 115, A1c 6.4%\par Cr 0.86, GFR 68\par urine microalb/Cr 39\par LDL 48, HDL 52, Tg 104\par TSH 1.43, FT4 1.0\par A1c 6.5%\par Vit D 31\par \par Labs 1/4/19\par Cr 0.95, GFR 60\par urine microalb/Cr 34\par LDl chol 54, Tg 124\par TSh 2.03, FT4 1.2\par A1c 6.3%\par \par Labs 7/12/19\par A1c 7.4%\par urine microalb/Cr 29\par LDL 50, HDL 41, Tg 143\par TSH 0.57, FT4 1.1, Ft3 3.0\par \par Lans 10/4/19\par Gluc 137, A1c 6.5%\par Cr 0.89, GFR 65\par LDL 39, HDL 43, Tg 93\par TSH 0.15, FT4 1.2, FT3 3.6\par \par Labs 1/10/20\par Gluc 121, A1c 7.1\par Cr 0.85, GFR 68\par urine microalb/Cr 67\par LDL 46, HDL 57, Tg 78\par TSH 0.91, FT4 0.9\par mild anemia\par B12 707\par \par Labs 11/4/20\par urine alb/Cr 39\par LDL 48, HDL 52, Tg 102\par Gluc 139, A1c 6.8\par Cr 0.90, GFR 63\par TSH 2.38, Ft4 1.1\par Hct 33.1\par \par Labs 2/4/21\par urine alb/Cr 52\par LDL 48, HDL 41, Tg 121\par GLuc 164, A1c 7.9\par TSH 2.14\par Cr 0.91, GFR 63\par Hb 10.7, Hct 33.3\par B12 772\par \par Labs 5/12/21\par HDL 46, LDL 48, Tg 96\par Gluc 121, A1c 7.4\par Cr 0.85, GFR 68\par TSH 2.53, FT4 1.0. FT3 2.9\par \par Labs 8/13/21\par urine alb/Cr 60\par LDL 42, HDL 47, Tg 112\par Gluc 131, A1c 7.3\par Cr 0.97, GFR 58\par TSH 2.65, Ft4 0.9\par Hb 10.5\par \par Labs 11/10/21\par LDL 51, HDL 51, Tg 115\par Gluc 167, A1c 7.5\par Cr 0.87, GFR 66\par TSH 2.39, FT4 1.0, FT3 3.1\par Hb 11\par \par Labs 2/25/22\par urine alb/Cr 19\par LDL 39, HDL 41, Tg 147\par Gluc 162, A1c 8.5\par Cr 1.04, GFR 53\par TSH 1.91\par \par labs 6/1/22\par HDL 44, LDL 45, Tg 105\par Gluc 131, A1c 8.4\par Cr 0.98, GFR 57\par TSH 1.48, FT4 1.0, FT3 2.9\par \par Labs 10/11/22\par LDL 38, HDL 44, Tg 100\par A1c 6.6\par TSH 3.31, FT4 0.9, FT3 3.3

## 2022-11-26 NOTE — HISTORY OF PRESENT ILLNESS
[FreeTextEntry1] : Interval Hx - increased stress at home with husbands medical issues. neuropathic pains from spine issues - needs to see pain management and ROBERT, undergoing PT\par \par 1. T2DM\par Quality: Type 2\par Severity: moderate\par Duration: 20+ years\par Onset: blood test\par \par ASSOCIATED SYMPTOMS/COMPLICATIONS:\par 2022 eye exam mild NPDR, no DME s/p focal laser\par (+) albuminuria on ACEi\par (-) neuropathy\par 2015 cardiac cath - nonobstructive disease\par (+) PAD -  asymptomatic\par \par MODIFYING FACTORS:  stopped jardiance due to yeast infx, stopped januvia when started Trulicity, lost some weight w trulicity\par Current DM meds:\par Lantus 24 units at HS (using vials)\par Trulicity 0.75 mg weekly\par  mg 2 tabs BID\par \par SMBG: denies hypoglycemic symptoms. meter reviewed. FS \par -------------------------------------------------------------\par 2. 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  (+) palpitations and restarted MMI 10/2019 5 mg daily.\par --------------------------------------------------------------\par 3. Thyroid nodules Dx 2011 with benign FNA - denies anterior neck pain, dysphagia. denies voice changes\par --------------------------------------------------------------------------\par 4. HLD - on Lipitor 80 mg daily by cardio\par \par \par \par \par \par

## 2022-11-26 NOTE — REVIEW OF SYSTEMS
[Recent Weight Loss (___ Lbs)] : recent weight loss: [unfilled] lbs [As Noted in HPI] : as noted in HPI [Polyuria] : polyuria [Nocturia] : nocturia [Anxiety] : anxiety [Stress] : stress [Back Pain] : back pain [Blurred Vision] : no blurred vision [Chest Pain] : no chest pain [Shortness Of Breath] : no shortness of breath [SOB on Exertion] : no shortness of breath on exertion [Nausea] : no nausea [Abdominal Pain] : no abdominal pain [Pain/Numbness of Digits] : no pain/numbness of digits [Depression] : no depression [Polydipsia] : no polydipsia

## 2022-11-26 NOTE — PHYSICAL EXAM
[Alert] : alert [Obese] : obese [No Acute Distress] : no acute distress [EOMI] : extra ocular movement intact [No Accessory Muscle Use] : no accessory muscle use [Clear to Auscultation] : lungs were clear to auscultation bilaterally [Normal S1, S2] : normal S1 and S2 [Normal Rate] : heart rate was normal [No Edema] : no peripheral edema [Not Tender] : non-tender [Soft] : abdomen soft [Oriented x3] : oriented to person, place, and time [Normal Affect] : the affect was normal [Normal Insight/Judgement] : insight and judgment were intact [Normal Mood] : the mood was normal

## 2022-11-26 NOTE — ASSESSMENT
[FreeTextEntry1] : 1. 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 thyroid nodules - 2021 benign FNA thyroid biopsy, repeat sono needed to monitor size and appearance of nodules, RTO thyroid sonogram\par \par 3. Hyperthyroid - euthyroid, normal TSH, has been on MMi for a while, trial of dose reduction\par - decrease MMi 5 mg 1/2 daily, repeat TFTs 1 week before next visit. monitor CBC and CMP\par \par 4. Hyperlipidemia - stable, cont statin\par \par Bone density monitored by PCP\par \par \par \par

## 2022-12-08 ENCOUNTER — APPOINTMENT (OUTPATIENT)
Dept: ENDOCRINOLOGY | Facility: CLINIC | Age: 77
End: 2022-12-08

## 2022-12-08 PROCEDURE — 76536 US EXAM OF HEAD AND NECK: CPT

## 2022-12-12 ENCOUNTER — NON-APPOINTMENT (OUTPATIENT)
Age: 77
End: 2022-12-12

## 2022-12-28 ENCOUNTER — RX RENEWAL (OUTPATIENT)
Age: 77
End: 2022-12-28

## 2023-03-20 LAB
HBA1C MFR BLD HPLC: 7.3
LDLC SERPL DIRECT ASSAY-MCNC: 51
MICROALBUMIN/CREAT 24H UR-RTO: 167

## 2023-03-21 ENCOUNTER — APPOINTMENT (OUTPATIENT)
Dept: ENDOCRINOLOGY | Facility: CLINIC | Age: 78
End: 2023-03-21
Payer: MEDICARE

## 2023-03-21 VITALS
OXYGEN SATURATION: 98 % | SYSTOLIC BLOOD PRESSURE: 110 MMHG | BODY MASS INDEX: 34.96 KG/M2 | HEIGHT: 62 IN | WEIGHT: 190 LBS | DIASTOLIC BLOOD PRESSURE: 70 MMHG | HEART RATE: 67 BPM

## 2023-03-21 LAB — GLUCOSE BLDC GLUCOMTR-MCNC: 137

## 2023-03-21 PROCEDURE — 82962 GLUCOSE BLOOD TEST: CPT

## 2023-03-21 PROCEDURE — 99214 OFFICE O/P EST MOD 30 MIN: CPT | Mod: 25

## 2023-03-21 RX ORDER — GABAPENTIN 300 MG/1
300 CAPSULE ORAL
Qty: 30 | Refills: 0 | Status: ACTIVE | COMMUNITY
Start: 2023-03-21

## 2023-03-21 RX ORDER — PNV NO.95/FERROUS FUM/FOLIC AC 28MG-0.8MG
TABLET ORAL
Refills: 0 | Status: DISCONTINUED | COMMUNITY
End: 2023-03-21

## 2023-03-21 NOTE — ASSESSMENT
[FreeTextEntry1] : 1. 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 thyroid nodules - 2021 benign FNA thyroid biopsy, repeat sono needed to monitor size and appearance of nodules, RTO thyroid sonogram 6/2023\par \par 3. Hyperthyroid - euthyroid, normal TSH, has been on MMi for a while, trial of dose reduction\par - cont MMi 5 mg 1/2 daily, repeat TFTs 1 week before next visit. monitor CBC and CMP\par \par 4. Hyperlipidemia - stable, cont statin\par \par 5. Bone Health - Bone density monitored by PCP, osteopenia by history but would like me to take care of this\par - resume OTC Ca + D\par - discussed weight bearing exercises\par - check 25 vit D level\par \par \par \par \par

## 2023-03-21 NOTE — HISTORY OF PRESENT ILLNESS
[FreeTextEntry1] : Interval Hx - osteopenia on hip Xray, last bone density ?2 years ago\par stress at home with  medical issues, has aides at home, not eating well\par \par 1. T2DM\par Quality: Type 2\par Severity: moderate\par Duration: 20+ years\par Onset: blood test\par \par ASSOCIATED SYMPTOMS/COMPLICATIONS:\par 2022 eye exam mild NPDR, no DME s/p focal laser\par (+) albuminuria on ACEi\par (-) neuropathy\par 2015 cardiac cath - nonobstructive disease\par (+) PAD -  asymptomatic\par \par MODIFYING FACTORS:  stopped jardiance due to yeast infx, stopped januvia when started Trulicity, lost some weight w trulicity\par Current DM meds:\par Lantus 24 units at HS (using vials)\par Trulicity1.5 mg weekly\par MFN  mg 2 tabs BID\par \par SMBG: denies hypoglycemic symptoms. meter reviewed. 's, occ 170's\par -------------------------------------------------------------\par 2. 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  (+) palpitations and restarted MMI 10/2019 5 mg daily and now on 1/2 tab daily\par --------------------------------------------------------------\par 3. Thyroid nodules Dx 2011 with benign FNA - denies anterior neck pain, dysphagia. denies voice changes\par --------------------------------------------------------------------------\par 4. HLD - on Lipitor 80 mg daily by cardio\par \par \par \par \par \par

## 2023-03-21 NOTE — PHYSICAL EXAM
[Alert] : alert [Obese] : obese [No Acute Distress] : no acute distress [EOMI] : extra ocular movement intact [Clear to Auscultation] : lungs were clear to auscultation bilaterally [Normal S1, S2] : normal S1 and S2 [Normal Rate] : heart rate was normal [No Edema] : no peripheral edema [Not Tender] : non-tender [Soft] : abdomen soft [Oriented x3] : oriented to person, place, and time [Normal Affect] : the affect was normal [Normal Insight/Judgement] : insight and judgment were intact [Normal Mood] : the mood was normal [No Respiratory Distress] : no respiratory distress

## 2023-03-21 NOTE — DATA REVIEWED
[FreeTextEntry1] : Thyroid FNA 3/16/16 RMP nodule - benign follicular nodule with post hemorrhagic change\par \par Thyroid sono 5/2018 in office - stable thyroid nodules\par \par Thyroid Ultrasound Report 1/31/19 \par Comparison: Report dated 5/17/18. \par Right thyroid lobe  5.9x2.2x2.6   cm - heterogeneous gland\par Right upper pole heterogeneous nodule 7x4 mm - not vascular, new\par Right mid pole nodule 1.0x.7x1.0cm - vascular, (+) microcalcifications, stable, prevous FNA 2016 was benign\par Left Thyroid lobe   6.0x2.8x2.7  cm - heterogeneous gland\par Left upper pole heterogeneous nodule 7x4 mm -stable\par prior Left mid pole calcification not seen on images\par Isthmus .3  cm\par Impression\par new small Right thyroid nodule, stable right and left thyroid nodules.  Largest Right thyroid nodule previously biopsied and reported as benign\par repeat sono 6 months \par \par Thyroid Ultrasound Report 7/11/19 \par Comparison: Report dated 1/31/19. \par Findings: \par Right thyroid lobe  5.4x2.4x2.5   cm, enlarged, heterogeneous\par Right upper pole nodule 9x5x6 mm - increased size, hypoechoic, not vascular\par Right mid pole nodule 7x6x10 mm with coarse calcification 1 mm- hypoechoic, stable, benign FNA in 2016\par Left Thyroid lobe   5.4x2.5x2.7  cm, enlarged, heterogeneous\par Left upper pole nodule 7x5x5 mm - hypoechoic, not vascular, stable\par Isthmus .3  cm\par Impression\par Right upper pole nodule - slowly increasing in size, repeat sono 4 months\par stable Right mid pole and Left upper pole nodules\par \par Thyroid Ultrasound Report 11/14/19 \par Comparison: Report dated 7/11/19. \par Findings: \par Right thyroid lobe  4.9x2.2x2.2   cm - mildly heterogeneous\par Right upper pole nodule 9x5x6 mm, stable\par Right mid pole nodule [id number] mm with 1 mm coarse calcification, scant vascularity, stable, benign FNA in 2018\par Left Thyroid lobe  5.2x2.8x2.9   cm - mildly heterogeneous\par Left upper pole nodule 7x6x5 mm - hypoechoic, stable\par Isthmus  .3 cm\par Impression\par stable bilateral thyroid nodules\par largest Right mid pole thyroid nodule previously biopsied and reported as benign\par repeat thyroid sonogram 1 year  \par \par Thyroid Ultrasound Report 1/21/21 \par Comparison: Report dated 11/14/19. \par Findings: \par Right thyroid lobe  5.1x2.4x2.3   cm -enlarged, homogeneous gland\par Right upper pole nodule 9x6x6 mm - hypoechoic, not vascular, stable\par Right mid pole nodule 12x7 mm with 1 mm coarse calcification - vascular, stable, benign FNA in 2018\par Left Thyroid lobe  5.9x2.7x2.5   cm - enlarged, homogeneous gland\par Left  upper pole nodule 8x6x6 mm - stable, hypoechoic\par Left Lower pole area [id number] mm - heterogeneous are vs nodule\par Isthmus  .4 cm\par Impression\par stable bilateral thyroid nodule\par possible new Left lower pole nodule vs patchy area of heterogeneous tissue\par repeat sonogram 4 months \par \par \par Thyroid Ultrasound Report 6/10/21 \par Comparison: Report dated 1/21/21. \par Findings: \par Right thyroid lobe  5.8x2.2x2.8   cm - homogeneous, enlarged\par Right upper pole nodule 9x6x7 mm - hypoechoic, stable, not vascular\par Right mid pole nodule 09x4b28 mm with 1 mm calcification- heterogeneous, stable, isoechoic, not vascular, benign FNA in 2018\par Left Thyroid lobe 5.2x2.9x2.8    cm - homogeneous, enlarged\par Left upper pole nodule 4x4x4 mm - hypoechoic, not vascular, decreased size\par Left lower pole nodule [id number] mm - appears as nodule on images, heterogeneous, not vascular\par Isthmus .7  cm\par Impression\par stable Right upper pole nodule and stable Right mid pole nodule\par Left upper pole nodule smaller\par Left lower pole nodule appears as discrete nodule on images and meets criteria for FNA biopsy \par \par Thyroid FNA 10/6/21 Left LP nodule - benign, cat 2, adenomatous nodular goiter\par \par Thyroid Ultrasound Report 12/8/22 \par Comparison: Report dated 6/10/21. \par Findings: \par Right thyroid lobe 5.8x2.5x2.5    cm\par Right upper pole nodule 12x7x7 mm - heterogeneous, hypoechoic, no microcalcifications, slight increase in size\par Right mid pole nodule [id number] mm w coarse calcification - heterogeneous, hypoechoic, some vascularity, stable (benign FNA in 2016)\par Left Thyroid lobe  6.2x3.2x1.9   cm\par Left upper pole nodule 4x4x5 mm - heterogeneous, hypoechoic, not vascular, stable\par Left lower pole nodule [id number] mm - heterogeneous, hypoechoic, not vascular, stable (benign fna in 2021)\par Isthmus 0.6  cm\par Impression\par bilateral thyroid nodules most of which are stable, slight increased to the right upper thyroid nodules.\par Bilateral dominant nodules with benign FNA biopsy in the past\par repeat sonogram in 6 months \par =======================================================\par labs 9/14/18\par Cr 0.95\par GFR 60\par neg urine microalb/Cr 20\par LDL chol 37, HDl 51\par TSh 2.10 Ft4 1.1, FT3 2.8\par B12 1064\par \par Labs 4/5/19\par Gluc 115, A1c 6.4%\par Cr 0.86, GFR 68\par urine microalb/Cr 39\par LDL 48, HDL 52, Tg 104\par TSH 1.43, FT4 1.0\par A1c 6.5%\par Vit D 31\par \par Labs 1/4/19\par Cr 0.95, GFR 60\par urine microalb/Cr 34\par LDl chol 54, Tg 124\par TSh 2.03, FT4 1.2\par A1c 6.3%\par \par Labs 7/12/19\par A1c 7.4%\par urine microalb/Cr 29\par LDL 50, HDL 41, Tg 143\par TSH 0.57, FT4 1.1, Ft3 3.0\par \par Lans 10/4/19\par Gluc 137, A1c 6.5%\par Cr 0.89, GFR 65\par LDL 39, HDL 43, Tg 93\par TSH 0.15, FT4 1.2, FT3 3.6\par \par Labs 1/10/20\par Gluc 121, A1c 7.1\par Cr 0.85, GFR 68\par urine microalb/Cr 67\par LDL 46, HDL 57, Tg 78\par TSH 0.91, FT4 0.9\par mild anemia\par B12 707\par \par Labs 11/4/20\par urine alb/Cr 39\par LDL 48, HDL 52, Tg 102\par Gluc 139, A1c 6.8\par Cr 0.90, GFR 63\par TSH 2.38, Ft4 1.1\par Hct 33.1\par \par Labs 2/4/21\par urine alb/Cr 52\par LDL 48, HDL 41, Tg 121\par GLuc 164, A1c 7.9\par TSH 2.14\par Cr 0.91, GFR 63\par Hb 10.7, Hct 33.3\par B12 772\par \par Labs 5/12/21\par HDL 46, LDL 48, Tg 96\par Gluc 121, A1c 7.4\par Cr 0.85, GFR 68\par TSH 2.53, FT4 1.0. FT3 2.9\par \par Labs 8/13/21\par urine alb/Cr 60\par LDL 42, HDL 47, Tg 112\par Gluc 131, A1c 7.3\par Cr 0.97, GFR 58\par TSH 2.65, Ft4 0.9\par Hb 10.5\par \par Labs 11/10/21\par LDL 51, HDL 51, Tg 115\par Gluc 167, A1c 7.5\par Cr 0.87, GFR 66\par TSH 2.39, FT4 1.0, FT3 3.1\par Hb 11\par \par Labs 2/25/22\par urine alb/Cr 19\par LDL 39, HDL 41, Tg 147\par Gluc 162, A1c 8.5\par Cr 1.04, GFR 53\par TSH 1.91\par \par labs 6/1/22\par HDL 44, LDL 45, Tg 105\par Gluc 131, A1c 8.4\par Cr 0.98, GFR 57\par TSH 1.48, FT4 1.0, FT3 2.9\par \par Labs 10/11/22\par LDL 38, HDL 44, Tg 100\par A1c 6.6\par TSH 3.31, FT4 0.9, FT3 3.3\par \par Labs 3/14/23\par urine alb/Cr 167\par LDL 51, HDL 55, Trig 84\par Gluc 96, A1c 7.3\par Cr 0.86, GFR 70\par TSH 1.37, FT4 1.0. FT3 3.2

## 2023-03-21 NOTE — REVIEW OF SYSTEMS
[Nocturia] : nocturia [Anxiety] : anxiety [Stress] : stress [Recent Weight Gain (___ Lbs)] : recent weight gain: [unfilled] lbs [Blurred Vision] : no blurred vision [Chest Pain] : no chest pain [Shortness Of Breath] : no shortness of breath [SOB on Exertion] : no shortness of breath on exertion [Nausea] : no nausea [Abdominal Pain] : no abdominal pain [Polyuria] : no polyuria [Pain/Numbness of Digits] : no pain/numbness of digits [Depression] : no depression [Polydipsia] : no polydipsia

## 2023-04-17 ENCOUNTER — OFFICE (OUTPATIENT)
Dept: URBAN - METROPOLITAN AREA CLINIC 115 | Facility: CLINIC | Age: 78
Setting detail: OPHTHALMOLOGY
End: 2023-04-17
Payer: MEDICARE

## 2023-04-17 DIAGNOSIS — D31.32: ICD-10-CM

## 2023-04-17 DIAGNOSIS — E11.3392: ICD-10-CM

## 2023-04-17 DIAGNOSIS — H35.033: ICD-10-CM

## 2023-04-17 DIAGNOSIS — H43.392: ICD-10-CM

## 2023-04-17 DIAGNOSIS — E11.3391: ICD-10-CM

## 2023-04-17 DIAGNOSIS — H43.813: ICD-10-CM

## 2023-04-17 PROCEDURE — 92134 CPTRZ OPH DX IMG PST SGM RTA: CPT | Performed by: SPECIALIST

## 2023-04-17 PROCEDURE — 92014 COMPRE OPH EXAM EST PT 1/>: CPT | Performed by: SPECIALIST

## 2023-04-17 PROCEDURE — 92235 FLUORESCEIN ANGRPH MLTIFRAME: CPT | Performed by: SPECIALIST

## 2023-04-17 ASSESSMENT — REFRACTION_CURRENTRX
OD_CYLINDER: 0.00
OD_AXIS: 180
OD_SPHERE: -2.00
OS_VPRISM_DIRECTION: SV
OD_OVR_VA: 20/
OD_VPRISM_DIRECTION: SV
OS_CYLINDER: -0.50
OS_SPHERE: -1.50
OS_AXIS: 071
OS_OVR_VA: 20/

## 2023-04-17 ASSESSMENT — CONFRONTATIONAL VISUAL FIELD TEST (CVF)
OD_FINDINGS: FULL
OS_FINDINGS: FULL

## 2023-04-17 ASSESSMENT — TONOMETRY
OD_IOP_MMHG: 10
OS_IOP_MMHG: 13

## 2023-04-17 ASSESSMENT — VISUAL ACUITY
OD_BCVA: 20/30
OS_BCVA: 20/20

## 2023-04-17 ASSESSMENT — LID EXAM ASSESSMENTS
OD_BLEPHARITIS: RLL
OS_BLEPHARITIS: LLL

## 2023-04-24 ENCOUNTER — RX RENEWAL (OUTPATIENT)
Age: 78
End: 2023-04-24

## 2023-04-25 NOTE — RESULTS/DATA
Final urine culture report is negative.  Adult Negative Urine culture parameters per protocol: Any # Urogenital single or mixed organism, <10,000 col/ml single organism (cath/midstream), and > 3 organisms (No susceptibilities performed).  [facility] Emergency Dept discharge antibiotic prescribed (If applicable): None  Treatment recommendations per [facility] ED Lab Result Urine Culture protocol.   Left message regarding carotid ultrasound at [address] [place] [address] on Monday, 5-22-23 at 2:00 pm.  [place] at 1:30 pm. [FreeTextEntry1] :

## 2023-06-01 ENCOUNTER — APPOINTMENT (OUTPATIENT)
Dept: ENDOCRINOLOGY | Facility: CLINIC | Age: 78
End: 2023-06-01
Payer: MEDICARE

## 2023-06-01 PROCEDURE — 76536 US EXAM OF HEAD AND NECK: CPT

## 2023-06-21 LAB
HBA1C MFR BLD HPLC: 6.8
LDLC SERPL DIRECT ASSAY-MCNC: 36
MICROALBUMIN/CREAT 24H UR-RTO: 43
TSH SERPL-ACNC: 1.31

## 2023-06-22 ENCOUNTER — RX RENEWAL (OUTPATIENT)
Age: 78
End: 2023-06-22

## 2023-06-22 ENCOUNTER — RESULT CHARGE (OUTPATIENT)
Age: 78
End: 2023-06-22

## 2023-06-22 ENCOUNTER — APPOINTMENT (OUTPATIENT)
Dept: ENDOCRINOLOGY | Facility: CLINIC | Age: 78
End: 2023-06-22
Payer: MEDICARE

## 2023-06-22 VITALS
HEIGHT: 62 IN | SYSTOLIC BLOOD PRESSURE: 130 MMHG | HEART RATE: 74 BPM | DIASTOLIC BLOOD PRESSURE: 64 MMHG | BODY MASS INDEX: 35.51 KG/M2 | WEIGHT: 193 LBS | OXYGEN SATURATION: 98 %

## 2023-06-22 DIAGNOSIS — R61 GENERALIZED HYPERHIDROSIS: ICD-10-CM

## 2023-06-22 LAB — GLUCOSE BLDC GLUCOMTR-MCNC: 145

## 2023-06-22 PROCEDURE — 99214 OFFICE O/P EST MOD 30 MIN: CPT | Mod: 25

## 2023-06-22 PROCEDURE — 82962 GLUCOSE BLOOD TEST: CPT

## 2023-06-22 RX ORDER — DABIGATRAN ETEXILATE MESYLATE 150 MG/1
150 CAPSULE ORAL TWICE DAILY
Refills: 0 | Status: DISCONTINUED | COMMUNITY
Start: 2018-09-21 | End: 2023-06-22

## 2023-06-22 NOTE — DATA REVIEWED
[FreeTextEntry1] : Thyroid FNA 3/16/16 RMP nodule - benign follicular nodule with post hemorrhagic change\par \par Thyroid sono 5/2018 in office - stable thyroid nodules\par \par Thyroid Ultrasound Report 1/31/19 \par Comparison: Report dated 5/17/18. \par Right thyroid lobe  5.9x2.2x2.6   cm - heterogeneous gland\par Right upper pole heterogeneous nodule 7x4 mm - not vascular, new\par Right mid pole nodule 1.0x.7x1.0cm - vascular, (+) microcalcifications, stable, prevous FNA 2016 was benign\par Left Thyroid lobe   6.0x2.8x2.7  cm - heterogeneous gland\par Left upper pole heterogeneous nodule 7x4 mm -stable\par prior Left mid pole calcification not seen on images\par Isthmus .3  cm\par Impression\par new small Right thyroid nodule, stable right and left thyroid nodules.  Largest Right thyroid nodule previously biopsied and reported as benign\par repeat sono 6 months \par \par Thyroid Ultrasound Report 7/11/19 \par Comparison: Report dated 1/31/19. \par Findings: \par Right thyroid lobe  5.4x2.4x2.5   cm, enlarged, heterogeneous\par Right upper pole nodule 9x5x6 mm - increased size, hypoechoic, not vascular\par Right mid pole nodule 7x6x10 mm with coarse calcification 1 mm- hypoechoic, stable, benign FNA in 2016\par Left Thyroid lobe   5.4x2.5x2.7  cm, enlarged, heterogeneous\par Left upper pole nodule 7x5x5 mm - hypoechoic, not vascular, stable\par Isthmus .3  cm\par Impression\par Right upper pole nodule - slowly increasing in size, repeat sono 4 months\par stable Right mid pole and Left upper pole nodules\par \par Thyroid Ultrasound Report 11/14/19 \par Comparison: Report dated 7/11/19. \par Findings: \par Right thyroid lobe  4.9x2.2x2.2   cm - mildly heterogeneous\par Right upper pole nodule 9x5x6 mm, stable\par Right mid pole nodule [id number] mm with 1 mm coarse calcification, scant vascularity, stable, benign FNA in 2018\par Left Thyroid lobe  5.2x2.8x2.9   cm - mildly heterogeneous\par Left upper pole nodule 7x6x5 mm - hypoechoic, stable\par Isthmus  .3 cm\par Impression\par stable bilateral thyroid nodules\par largest Right mid pole thyroid nodule previously biopsied and reported as benign\par repeat thyroid sonogram 1 year  \par \par Thyroid Ultrasound Report 1/21/21 \par Comparison: Report dated 11/14/19. \par Findings: \par Right thyroid lobe  5.1x2.4x2.3   cm -enlarged, homogeneous gland\par Right upper pole nodule 9x6x6 mm - hypoechoic, not vascular, stable\par Right mid pole nodule 12x7 mm with 1 mm coarse calcification - vascular, stable, benign FNA in 2018\par Left Thyroid lobe  5.9x2.7x2.5   cm - enlarged, homogeneous gland\par Left  upper pole nodule 8x6x6 mm - stable, hypoechoic\par Left Lower pole area [id number] mm - heterogeneous are vs nodule\par Isthmus  .4 cm\par Impression\par stable bilateral thyroid nodule\par possible new Left lower pole nodule vs patchy area of heterogeneous tissue\par repeat sonogram 4 months \par \par \par Thyroid Ultrasound Report 6/10/21 \par Comparison: Report dated 1/21/21. \par Findings: \par Right thyroid lobe  5.8x2.2x2.8   cm - homogeneous, enlarged\par Right upper pole nodule 9x6x7 mm - hypoechoic, stable, not vascular\par Right mid pole nodule 32o7k02 mm with 1 mm calcification- heterogeneous, stable, isoechoic, not vascular, benign FNA in 2018\par Left Thyroid lobe 5.2x2.9x2.8    cm - homogeneous, enlarged\par Left upper pole nodule 4x4x4 mm - hypoechoic, not vascular, decreased size\par Left lower pole nodule 52s26x03 mm - appears as nodule on images, heterogeneous, not vascular\par Isthmus .7  cm\par Impression\par stable Right upper pole nodule and stable Right mid pole nodule\par Left upper pole nodule smaller\par Left lower pole nodule appears as discrete nodule on images and meets criteria for FNA biopsy \par \par Thyroid FNA 10/6/21 Left LP nodule - benign, cat 2, adenomatous nodular goiter\par \par Thyroid Ultrasound Report 12/8/22 \par Comparison: Report dated 6/10/21. \par Findings: \par Right thyroid lobe 5.8x2.5x2.5    cm\par Right upper pole nodule 12x7x7 mm - heterogeneous, hypoechoic, no microcalcifications, slight increase in size\par Right mid pole nodule 20u0e31 mm w coarse calcification - heterogeneous, hypoechoic, some vascularity, stable (benign FNA in 2016)\par Left Thyroid lobe  6.2x3.2x1.9   cm\par Left upper pole nodule 4x4x5 mm - heterogeneous, hypoechoic, not vascular, stable\par Left lower pole nodule [id number] mm - heterogeneous, hypoechoic, not vascular, stable (benign fna in 2021)\par Isthmus 0.6  cm\par Impression\par bilateral thyroid nodules most of which are stable, slight increased to the right upper thyroid nodules.\par Bilateral dominant nodules with benign FNA biopsy in the past\par repeat sonogram in 6 months \par \par Thyroid Ultrasound Report 6/1/23 \par Comparison: Report dated 12/8/22. \par Findings: \par Right thyroid lobe  5.4x2.8x2.8   cm - heterogeneous gland\par Right upper pole nodule 12x7x8 mm - stable, complex, not vascular\par Right mid pole nodule 23o8e93 mm w 1 mm coarse calcification - stable, isoechoic, heterogeneous  (benign FNA in 2016)\par Left Thyroid lobe   6.7x3.4x3.3  cm - heterogeneous gland\par Left upper pole nodule 6x5x5 mm - complex, not vascular, stable\par Left lower pole nodule 47l71y55 mm - stable, heterogeneous, complex, not vascular (benign fna in 2021)\par Isthmus  0.7 cm\par Impression: Multiple thyroid nodules that are overall stable in size and appearance.  There are no significant changes when allowing for differences in measurement technique.\par Bilateral dominant nodules with benign FNA biopsy in the past\par repeat sono 1 year \par \par -===================================================\par DXA 5/9/23 osteopenia, FRAX MOF 15%, hip fx 2.5%\par =======================================================\par labs 9/14/18\par Cr 0.95\par GFR 60\par neg urine microalb/Cr 20\par LDL chol 37, HDl 51\par TSh 2.10 Ft4 1.1, FT3 2.8\par B12 1064\par \par Labs 4/5/19\par Gluc 115, A1c 6.4%\par Cr 0.86, GFR 68\par urine microalb/Cr 39\par LDL 48, HDL 52, Tg 104\par TSH 1.43, FT4 1.0\par A1c 6.5%\par Vit D 31\par \par Labs 1/4/19\par Cr 0.95, GFR 60\par urine microalb/Cr 34\par LDl chol 54, Tg 124\par TSh 2.03, FT4 1.2\par A1c 6.3%\par \par Labs 7/12/19\par A1c 7.4%\par urine microalb/Cr 29\par LDL 50, HDL 41, Tg 143\par TSH 0.57, FT4 1.1, Ft3 3.0\par \par Lans 10/4/19\par Gluc 137, A1c 6.5%\par Cr 0.89, GFR 65\par LDL 39, HDL 43, Tg 93\par TSH 0.15, FT4 1.2, FT3 3.6\par \par Labs 1/10/20\par Gluc 121, A1c 7.1\par Cr 0.85, GFR 68\par urine microalb/Cr 67\par LDL 46, HDL 57, Tg 78\par TSH 0.91, FT4 0.9\par mild anemia\par B12 707\par \par Labs 11/4/20\par urine alb/Cr 39\par LDL 48, HDL 52, Tg 102\par Gluc 139, A1c 6.8\par Cr 0.90, GFR 63\par TSH 2.38, Ft4 1.1\par Hct 33.1\par \par Labs 2/4/21\par urine alb/Cr 52\par LDL 48, HDL 41, Tg 121\par GLuc 164, A1c 7.9\par TSH 2.14\par Cr 0.91, GFR 63\par Hb 10.7, Hct 33.3\par B12 772\par \par Labs 5/12/21\par HDL 46, LDL 48, Tg 96\par Gluc 121, A1c 7.4\par Cr 0.85, GFR 68\par TSH 2.53, FT4 1.0. FT3 2.9\par \par Labs 8/13/21\par urine alb/Cr 60\par LDL 42, HDL 47, Tg 112\par Gluc 131, A1c 7.3\par Cr 0.97, GFR 58\par TSH 2.65, Ft4 0.9\par Hb 10.5\par \par Labs 11/10/21\par LDL 51, HDL 51, Tg 115\par Gluc 167, A1c 7.5\par Cr 0.87, GFR 66\par TSH 2.39, FT4 1.0, FT3 3.1\par Hb 11\par \par Labs 2/25/22\par urine alb/Cr 19\par LDL 39, HDL 41, Tg 147\par Gluc 162, A1c 8.5\par Cr 1.04, GFR 53\par TSH 1.91\par \par labs 6/1/22\par HDL 44, LDL 45, Tg 105\par Gluc 131, A1c 8.4\par Cr 0.98, GFR 57\par TSH 1.48, FT4 1.0, FT3 2.9\par \par Labs 10/11/22\par LDL 38, HDL 44, Tg 100\par A1c 6.6\par TSH 3.31, FT4 0.9, FT3 3.3\par \par Labs 3/14/23\par urine alb/Cr 167\par LDL 51, HDL 55, Trig 84\par Gluc 96, A1c 7.3\par Cr 0.86, GFR 70\par TSH 1.37, FT4 1.0. FT3 3.2\par \par Labs 6/15/23\par urine alb/Cr 43\par LDL 36, HDL 48, Trig 68\par Gluc 112, A1c 6.8\par Cr 0.87, GFR 69\par TSH 1.31, FT4 1.0, FT3 3.0\par vit D 29

## 2023-06-22 NOTE — HISTORY OF PRESENT ILLNESS
[FreeTextEntry1] : Interval Hx - increased sweating lately, worse in am petra when walk around, some STYLES; denies associated palpitation/headache. sx associated with worsening anxiety\par \par 1. T2DM\par Quality: Type 2\par Severity: moderate\par Duration: 20+ years\par Onset: blood test\par \par ASSOCIATED SYMPTOMS/COMPLICATIONS:\par 2023 eye exam mild NPDR, no DME s/p focal laser\par (+) albuminuria on ACEi\par (-) neuropathy\par 2015 cardiac cath - nonobstructive disease\par (+) PAD -  asymptomatic\par \par MODIFYING FACTORS:  stopped jardiance due to yeast infx, stopped januvia when started Trulicity, lost some weight w trulicity\par Current DM meds:\par Lantus 24 units at HS (using vials)\par Trulicity 3 mg weekly (on this dose for past month)\par MFN  mg 2 tabs BID\par \par SMBG: denies hypoglycemic symptoms. meter reviewed. \par -------------------------------------------------------------\par 2. 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  (+) palpitations and restarted MMI 10/2019 5 mg daily and now on 1/2 tab daily\par --------------------------------------------------------------\par 3. Thyroid nodules Dx 2011 with benign FNA - denies anterior neck pain, dysphagia. denies voice changes\par --------------------------------------------------------------------------\par 4. HLD - on Lipitor 80 mg daily by cardio\par \par \par \par \par \par

## 2023-06-22 NOTE — PHYSICAL EXAM
[Alert] : alert [Obese] : obese [No Acute Distress] : no acute distress [EOMI] : extra ocular movement intact [No Respiratory Distress] : no respiratory distress [Clear to Auscultation] : lungs were clear to auscultation bilaterally [Normal S1, S2] : normal S1 and S2 [Normal Rate] : heart rate was normal [No Edema] : no peripheral edema [Not Tender] : non-tender [Soft] : abdomen soft [Oriented x3] : oriented to person, place, and time [Normal Affect] : the affect was normal [Normal Insight/Judgement] : insight and judgment were intact [Normal Mood] : the mood was normal [Acanthosis Nigricans] : no acanthosis nigricans [Foot Ulcers] : no foot ulcers [2+] : 2+ in the dorsalis pedis [Vibration Dec.] : diminished vibratory sensation at the level of the toes [Diminished Throughout Both Feet] : normal tactile sensation with monofilament testing throughout both feet 09-Jul-2019 17:19

## 2023-06-22 NOTE — REVIEW OF SYSTEMS
[Recent Weight Gain (___ Lbs)] : recent weight gain: [unfilled] lbs [Nocturia] : nocturia [Anxiety] : anxiety [Stress] : stress [Blurred Vision] : no blurred vision [Chest Pain] : no chest pain [Shortness Of Breath] : no shortness of breath [SOB on Exertion] : no shortness of breath on exertion [Nausea] : no nausea [Abdominal Pain] : no abdominal pain [Polyuria] : no polyuria [As Noted in HPI] : as noted in HPI [Pain/Numbness of Digits] : no pain/numbness of digits [Depression] : no depression [Polydipsia] : no polydipsia

## 2023-07-11 ENCOUNTER — RX RENEWAL (OUTPATIENT)
Age: 78
End: 2023-07-11

## 2023-10-24 ENCOUNTER — LABORATORY RESULT (OUTPATIENT)
Age: 78
End: 2023-10-24

## 2023-10-24 LAB
HBA1C MFR BLD HPLC: 6.1
LDLC SERPL DIRECT ASSAY-MCNC: 41
MICROALBUMIN/CREAT 24H UR-RTO: 63

## 2023-10-25 ENCOUNTER — APPOINTMENT (OUTPATIENT)
Dept: ENDOCRINOLOGY | Facility: CLINIC | Age: 78
End: 2023-10-25
Payer: MEDICARE

## 2023-10-25 VITALS
DIASTOLIC BLOOD PRESSURE: 68 MMHG | HEART RATE: 67 BPM | HEIGHT: 62 IN | WEIGHT: 190 LBS | SYSTOLIC BLOOD PRESSURE: 116 MMHG | BODY MASS INDEX: 34.96 KG/M2

## 2023-10-25 LAB — GLUCOSE BLDC GLUCOMTR-MCNC: 156

## 2023-10-25 PROCEDURE — 82962 GLUCOSE BLOOD TEST: CPT

## 2023-10-25 PROCEDURE — 99214 OFFICE O/P EST MOD 30 MIN: CPT | Mod: 25

## 2023-10-25 PROCEDURE — 36415 COLL VENOUS BLD VENIPUNCTURE: CPT

## 2023-10-27 RX ORDER — BLOOD SUGAR DIAGNOSTIC
STRIP MISCELLANEOUS
Qty: 300 | Refills: 1 | Status: ACTIVE | COMMUNITY
Start: 1900-01-01 | End: 1900-01-01

## 2023-12-13 ENCOUNTER — RX RENEWAL (OUTPATIENT)
Age: 78
End: 2023-12-13

## 2024-01-09 ENCOUNTER — RX RENEWAL (OUTPATIENT)
Age: 79
End: 2024-01-09

## 2024-01-15 RX ORDER — SYRINGE AND NEEDLE,INSULIN,1ML 28GX1/2"
31G X 5/16" SYRINGE, EMPTY DISPOSABLE MISCELLANEOUS
Qty: 100 | Refills: 2 | Status: ACTIVE | COMMUNITY
Start: 2019-04-12 | End: 1900-01-01

## 2024-01-30 ENCOUNTER — APPOINTMENT (OUTPATIENT)
Dept: RHEUMATOLOGY | Facility: CLINIC | Age: 79
End: 2024-01-30
Payer: MEDICARE

## 2024-01-30 VITALS
OXYGEN SATURATION: 95 % | HEART RATE: 72 BPM | BODY MASS INDEX: 35.88 KG/M2 | WEIGHT: 195 LBS | SYSTOLIC BLOOD PRESSURE: 149 MMHG | HEIGHT: 62 IN | TEMPERATURE: 98 F | DIASTOLIC BLOOD PRESSURE: 76 MMHG

## 2024-01-30 PROCEDURE — 99204 OFFICE O/P NEW MOD 45 MIN: CPT

## 2024-01-30 PROCEDURE — 36415 COLL VENOUS BLD VENIPUNCTURE: CPT

## 2024-01-30 NOTE — HISTORY OF PRESENT ILLNESS
[FreeTextEntry1] : 79 y/o female w/ T2DM on insulin, HLD, hyperthyroidism, Afib on Eliquis referred to rheumatology for b/l hands swelling, stiffness, and weakness. Pt reports b/l hands MCPs and PIPs pains, swelling x 3 months worst with use. Pt also reports trigger finger of R 3rd digits. Pt reports hip pains with MR with bursitis. Pt has known C-spine LUE radiculopathy s/p PT - follows with ortho but deferred epidural injection at the time. Mother - RA

## 2024-01-30 NOTE — ASSESSMENT
[FreeTextEntry1] : 77 y/o female w/ T2DM on insulin, HLD, hyperthyroidism, Afib on Eliquis referred to rheumatology for b/l hands swelling, stiffness, and weakness. Pt reports b/l hands MCPs and PIPs pains, swelling x 3 months worst with use. Pt also reports trigger finger of R 3rd digits. Pt reports hip pains with MR with bursitis. Pt has known C-spine LUE radiculopathy s/p PT - follows with ortho but deferred epidural injection at the time. Mother - RA  Patient as b/l hands MCPs, PIPs pains in setting of family Hx of RA. Although RA is definitely a consideration, patient does not have synovitis on exam and pains are worse with movement rather than in morning or rest - not characteristic of inflammatory arthritis.  - Obtain labwork to evaluate for signs of inflammatory arthritis - Obtain XR b/l hands. Will consider MR hand if refractory and if evaluation equivocal for RA to look for signs of inflammatory arthritis. - Obtain US b/l hands - Pt has gone to PT for her cervical radiculopathy and would like to hold off on OT of hands given limited amount of therapy provided by insurance. - Pt cannot take NSAIDs due to being on anticoagulation. Pt to continue with Tylenol PRN only. - Will contact pt with results for any initiation of treatment prior to follow up. RTC 2 months for follow up

## 2024-01-30 NOTE — PHYSICAL EXAM
[TextEntry] : GENERAL: Appears in no acute distress  HEENT: EOMI, PERRLA. No conjunctival erythema. Moist mucous membranes. No nasopharyngeal ulcers  NECK: Supple, no cervical lymphadenopathy, no thyromegaly  CARDIOVASCULAR: RRR. S1, S2 auscultated. No murmurs or rubs.  PULMONARY: Clear to auscultation b/l, no wheezes, rales, or crackles  ABDOMINAL: Soft, nontender, nondistended. Bowel sounds present. No organomegaly.  MSK:  No active synovitis, swelling, erythema, or warmth.  Discomfort to palpation of generalized MCPs and PIPs without tenderness Triggering of R 3rd digit palpable SKIN: No lesions or rashes  NEURO: No focal deficits. PSYCH: AAOx3. Normal affect and thought process.

## 2024-01-31 LAB
ALBUMIN SERPL ELPH-MCNC: 4.2 G/DL
ALP BLD-CCNC: 51 U/L
ALT SERPL-CCNC: 17 U/L
ANION GAP SERPL CALC-SCNC: 12 MMOL/L
AST SERPL-CCNC: 21 U/L
BILIRUB SERPL-MCNC: 0.3 MG/DL
BUN SERPL-MCNC: 21 MG/DL
CALCIUM SERPL-MCNC: 9.5 MG/DL
CCP AB SER IA-ACNC: <8 UNITS
CHLORIDE SERPL-SCNC: 105 MMOL/L
CO2 SERPL-SCNC: 24 MMOL/L
CREAT SERPL-MCNC: 0.99 MG/DL
CRP SERPL-MCNC: <3 MG/L
EGFR: 59 ML/MIN/1.73M2
ERYTHROCYTE [SEDIMENTATION RATE] IN BLOOD BY WESTERGREN METHOD: 16 MM/HR
GLUCOSE SERPL-MCNC: 94 MG/DL
HBV CORE IGG+IGM SER QL: NONREACTIVE
HBV SURFACE AB SER QL: NONREACTIVE
HBV SURFACE AG SER QL: NONREACTIVE
HCT VFR BLD CALC: 33.1 %
HCV AB SER QL: NONREACTIVE
HCV S/CO RATIO: 0.05 S/CO
HGB BLD-MCNC: 11 G/DL
MCHC RBC-ENTMCNC: 28.1 PG
MCHC RBC-ENTMCNC: 33.2 GM/DL
MCV RBC AUTO: 84.7 FL
PLATELET # BLD AUTO: 208 K/UL
POTASSIUM SERPL-SCNC: 5 MMOL/L
PROT SERPL-MCNC: 6.4 G/DL
RBC # BLD: 3.91 M/UL
RBC # FLD: 13.6 %
RF+CCP IGG SER-IMP: NEGATIVE
RHEUMATOID FACT SER QL: <10 IU/ML
SODIUM SERPL-SCNC: 141 MMOL/L
WBC # FLD AUTO: 8.19 K/UL

## 2024-02-03 LAB
M TB IFN-G BLD-IMP: NEGATIVE
QUANTIFERON TB PLUS MITOGEN MINUS NIL: >10 IU/ML
QUANTIFERON TB PLUS NIL: 0.01 IU/ML
QUANTIFERON TB PLUS TB1 MINUS NIL: 0 IU/ML
QUANTIFERON TB PLUS TB2 MINUS NIL: 0 IU/ML

## 2024-02-05 LAB — 14-3-3 ETA AG SER IA-MCNC: <0.2 NG/ML

## 2024-02-27 LAB
HBA1C MFR BLD HPLC: 6
LDLC SERPL DIRECT ASSAY-MCNC: 38
MICROALBUMIN/CREAT 24H UR-RTO: 68
TSH SERPL-ACNC: 1.29

## 2024-02-28 ENCOUNTER — APPOINTMENT (OUTPATIENT)
Dept: ENDOCRINOLOGY | Facility: CLINIC | Age: 79
End: 2024-02-28
Payer: MEDICARE

## 2024-02-28 ENCOUNTER — RX RENEWAL (OUTPATIENT)
Age: 79
End: 2024-02-28

## 2024-02-28 VITALS
WEIGHT: 193 LBS | HEIGHT: 62 IN | SYSTOLIC BLOOD PRESSURE: 122 MMHG | HEART RATE: 71 BPM | OXYGEN SATURATION: 95 % | BODY MASS INDEX: 35.51 KG/M2 | DIASTOLIC BLOOD PRESSURE: 72 MMHG

## 2024-02-28 LAB — GLUCOSE BLDC GLUCOMTR-MCNC: 114

## 2024-02-28 PROCEDURE — G2211 COMPLEX E/M VISIT ADD ON: CPT

## 2024-02-28 PROCEDURE — 82962 GLUCOSE BLOOD TEST: CPT

## 2024-02-28 PROCEDURE — 99215 OFFICE O/P EST HI 40 MIN: CPT

## 2024-02-28 RX ORDER — ADHESIVE TAPE 3"X 2.3 YD
50 MCG TAPE, NON-MEDICATED TOPICAL
Refills: 0 | Status: ACTIVE | COMMUNITY

## 2024-02-28 RX ORDER — PNV NO.95/FERROUS FUM/FOLIC AC 28MG-0.8MG
TABLET ORAL
Refills: 0 | Status: ACTIVE | COMMUNITY

## 2024-02-28 RX ORDER — DRONEDARONE 400 MG/1
400 TABLET, FILM COATED ORAL TWICE DAILY
Refills: 0 | Status: ACTIVE | COMMUNITY
Start: 2020-03-05

## 2024-02-28 RX ORDER — SERTRALINE HYDROCHLORIDE 100 MG/1
100 TABLET, FILM COATED ORAL DAILY
Refills: 0 | Status: ACTIVE | COMMUNITY

## 2024-02-28 RX ORDER — APIXABAN 5 MG/1
5 TABLET, FILM COATED ORAL
Refills: 0 | Status: ACTIVE | COMMUNITY

## 2024-02-28 RX ORDER — MULTIVITAMIN/IRON/FOLIC ACID 18MG-0.4MG
600-400 TABLET ORAL
Refills: 0 | Status: ACTIVE | COMMUNITY

## 2024-02-28 RX ORDER — INSULIN GLARGINE 100 [IU]/ML
100 INJECTION, SOLUTION SUBCUTANEOUS
Qty: 40 | Refills: 1 | Status: ACTIVE | COMMUNITY
Start: 2019-06-03

## 2024-02-28 NOTE — DATA REVIEWED
[FreeTextEntry1] : Thyroid FNA 3/16/16 RMP nodule - benign follicular nodule with post hemorrhagic change  Thyroid sono 5/2018 in office - stable thyroid nodules  Thyroid Ultrasound Report 1/31/19  Comparison: Report dated 5/17/18.  Right thyroid lobe  5.9x2.2x2.6   cm - heterogeneous gland Right upper pole heterogeneous nodule 7x4 mm - not vascular, new Right mid pole nodule 1.0x.7x1.0cm - vascular, (+) microcalcifications, stable, prevous FNA 2016 was benign Left Thyroid lobe   6.0x2.8x2.7  cm - heterogeneous gland Left upper pole heterogeneous nodule 7x4 mm -stable prior Left mid pole calcification not seen on images Isthmus .3  cm Impression new small Right thyroid nodule, stable right and left thyroid nodules.  Largest Right thyroid nodule previously biopsied and reported as benign repeat sono 6 months   Thyroid Ultrasound Report 7/11/19  Comparison: Report dated 1/31/19.  Findings:  Right thyroid lobe  5.4x2.4x2.5   cm, enlarged, heterogeneous Right upper pole nodule 9x5x6 mm - increased size, hypoechoic, not vascular Right mid pole nodule 7x6x10 mm with coarse calcification 1 mm- hypoechoic, stable, benign FNA in 2016 Left Thyroid lobe   5.4x2.5x2.7  cm, enlarged, heterogeneous Left upper pole nodule 7x5x5 mm - hypoechoic, not vascular, stable Isthmus .3  cm Impression Right upper pole nodule - slowly increasing in size, repeat sono 4 months stable Right mid pole and Left upper pole nodules  Thyroid Ultrasound Report 11/14/19  Comparison: Report dated 7/11/19.  Findings:  Right thyroid lobe  4.9x2.2x2.2   cm - mildly heterogeneous Right upper pole nodule 9x5x6 mm, stable Right mid pole nodule 80z1f82 mm with 1 mm coarse calcification, scant vascularity, stable, benign FNA in 2018 Left Thyroid lobe  5.2x2.8x2.9   cm - mildly heterogeneous Left upper pole nodule 7x6x5 mm - hypoechoic, stable Isthmus  .3 cm Impression stable bilateral thyroid nodules largest Right mid pole thyroid nodule previously biopsied and reported as benign repeat thyroid sonogram 1 year    Thyroid Ultrasound Report 1/21/21  Comparison: Report dated 11/14/19.  Findings:  Right thyroid lobe  5.1x2.4x2.3   cm -enlarged, homogeneous gland Right upper pole nodule 9x6x6 mm - hypoechoic, not vascular, stable Right mid pole nodule 12x7 mm with 1 mm coarse calcification - vascular, stable, benign FNA in 2018 Left Thyroid lobe  5.9x2.7x2.5   cm - enlarged, homogeneous gland Left  upper pole nodule 8x6x6 mm - stable, hypoechoic Left Lower pole area 52m21c75 mm - heterogeneous are vs nodule Isthmus  .4 cm Impression stable bilateral thyroid nodule possible new Left lower pole nodule vs patchy area of heterogeneous tissue repeat sonogram 4 months    Thyroid Ultrasound Report 6/10/21  Comparison: Report dated 1/21/21.  Findings:  Right thyroid lobe  5.8x2.2x2.8   cm - homogeneous, enlarged Right upper pole nodule 9x6x7 mm - hypoechoic, stable, not vascular Right mid pole nodule 57i1x86 mm with 1 mm calcification- heterogeneous, stable, isoechoic, not vascular, benign FNA in 2018 Left Thyroid lobe 5.2x2.9x2.8    cm - homogeneous, enlarged Left upper pole nodule 4x4x4 mm - hypoechoic, not vascular, decreased size Left lower pole nodule 74x29w25 mm - appears as nodule on images, heterogeneous, not vascular Isthmus .7  cm Impression stable Right upper pole nodule and stable Right mid pole nodule Left upper pole nodule smaller Left lower pole nodule appears as discrete nodule on images and meets criteria for FNA biopsy   Thyroid FNA 10/6/21 Left LP nodule - benign, cat 2, adenomatous nodular goiter  Thyroid Ultrasound Report 12/8/22  Comparison: Report dated 6/10/21.  Findings:  Right thyroid lobe 5.8x2.5x2.5    cm Right upper pole nodule 12x7x7 mm - heterogeneous, hypoechoic, no microcalcifications, slight increase in size Right mid pole nodule 55a6s66 mm w coarse calcification - heterogeneous, hypoechoic, some vascularity, stable (benign FNA in 2016) Left Thyroid lobe  6.2x3.2x1.9   cm Left upper pole nodule 4x4x5 mm - heterogeneous, hypoechoic, not vascular, stable Left lower pole nodule 89e98h74 mm - heterogeneous, hypoechoic, not vascular, stable (benign fna in 2021) Isthmus 0.6  cm Impression bilateral thyroid nodules most of which are stable, slight increased to the right upper thyroid nodules. Bilateral dominant nodules with benign FNA biopsy in the past repeat sonogram in 6 months   Thyroid Ultrasound Report 6/1/23  Comparison: Report dated 12/8/22.  Findings:  Right thyroid lobe  5.4x2.8x2.8   cm - heterogeneous gland Right upper pole nodule 12x7x8 mm - stable, complex, not vascular Right mid pole nodule 13h0b70 mm w 1 mm coarse calcification - stable, isoechoic, heterogeneous  (benign FNA in 2016) Left Thyroid lobe   6.7x3.4x3.3  cm - heterogeneous gland Left upper pole nodule 6x5x5 mm - complex, not vascular, stable Left lower pole nodule 98s78x42 mm - stable, heterogeneous, complex, not vascular (benign fna in 2021) Isthmus  0.7 cm Impression: Multiple thyroid nodules that are overall stable in size and appearance.  There are no significant changes when allowing for differences in measurement technique. Bilateral dominant nodules with benign FNA biopsy in the past repeat sono 1 year   thyroid sono 2/12/24 isthmus nodule 94l86m65 mm  - tr2 RMP 45x13h3 mm - tr5 (previously bippsied in 2016) LMP nodule 98l61r57 mmm - tr5 LMP 01e10t72 mm (previosy FNA 2021 benign)   -=================================================== DXA 5/9/23 osteopenia, FRAX MOF 15%, hip fx 2.5% ======================================================= labs 9/14/18 Cr 0.95 GFR 60 neg urine microalb/Cr 20 LDL chol 37, HDl 51 TSh 2.10 Ft4 1.1, FT3 2.8 B12 1064  Labs 4/5/19 Gluc 115, A1c 6.4% Cr 0.86, GFR 68 urine microalb/Cr 39 LDL 48, HDL 52, Tg 104 TSH 1.43, FT4 1.0 A1c 6.5% Vit D 31  Labs 1/4/19 Cr 0.95, GFR 60 urine microalb/Cr 34 LDl chol 54, Tg 124 TSh 2.03, FT4 1.2 A1c 6.3%  Labs 7/12/19 A1c 7.4% urine microalb/Cr 29 LDL 50, HDL 41, Tg 143 TSH 0.57, FT4 1.1, Ft3 3.0  Lans 10/4/19 Gluc 137, A1c 6.5% Cr 0.89, GFR 65 LDL 39, HDL 43, Tg 93 TSH 0.15, FT4 1.2, FT3 3.6  Labs 1/10/20 Gluc 121, A1c 7.1 Cr 0.85, GFR 68 urine microalb/Cr 67 LDL 46, HDL 57, Tg 78 TSH 0.91, FT4 0.9 mild anemia B12 707  Labs 11/4/20 urine alb/Cr 39 LDL 48, HDL 52, Tg 102 Gluc 139, A1c 6.8 Cr 0.90, GFR 63 TSH 2.38, Ft4 1.1 Hct 33.1  Labs 2/4/21 urine alb/Cr 52 LDL 48, HDL 41, Tg 121 GLuc 164, A1c 7.9 TSH 2.14 Cr 0.91, GFR 63 Hb 10.7, Hct 33.3 B12 772  Labs 5/12/21 HDL 46, LDL 48, Tg 96 Gluc 121, A1c 7.4 Cr 0.85, GFR 68 TSH 2.53, FT4 1.0. FT3 2.9  Labs 8/13/21 urine alb/Cr 60 LDL 42, HDL 47, Tg 112 Gluc 131, A1c 7.3 Cr 0.97, GFR 58 TSH 2.65, Ft4 0.9 Hb 10.5  Labs 11/10/21 LDL 51, HDL 51, Tg 115 Gluc 167, A1c 7.5 Cr 0.87, GFR 66 TSH 2.39, FT4 1.0, FT3 3.1 Hb 11  Labs 2/25/22 urine alb/Cr 19 LDL 39, HDL 41, Tg 147 Gluc 162, A1c 8.5 Cr 1.04, GFR 53 TSH 1.91  labs 6/1/22 HDL 44, LDL 45, Tg 105 Gluc 131, A1c 8.4 Cr 0.98, GFR 57 TSH 1.48, FT4 1.0, FT3 2.9  Labs 10/11/22 LDL 38, HDL 44, Tg 100 A1c 6.6 TSH 3.31, FT4 0.9, FT3 3.3  Labs 3/14/23 urine alb/Cr 167 LDL 51, HDL 55, Trig 84 Gluc 96, A1c 7.3 Cr 0.86, GFR 70 TSH 1.37, FT4 1.0. FT3 3.2  Labs 6/15/23 urine alb/Cr 43 LDL 36, HDL 48, Trig 68 Gluc 112, A1c 6.8 Cr 0.87, GFR 69 TSH 1.31, FT4 1.0, FT3 3.0 vit D 29  Labs 8/2023 normal plasma metanephrines  Labs 10/18/23 urine alb/cr 63 LDL 41, HDL 43, Trig 91 A1c 6.1, Gluc 110 B12 236  Labs 2/21/24 urine alb/Cr 68 LDL 38, HDL 46, Trig 93 A1c 6, Gluc 96 TSH 1.29

## 2024-02-28 NOTE — ASSESSMENT
[FreeTextEntry1] : 78 yr old female with  1. 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 thyroid nodules - 2016 and 2021 benign FNA thyroid biopsy, recent sonogram done at different facility with different nodules/sizes reports ?due to imaging technique - will need FNA biopssy of TR5 nodule that was not previously biopsied but will clarify sono finding with readiologist  3. Hyperthyroid - euthyroid, normal TSH, has been on MMi for a while, trial of dose reduction in 2019 was unsucessfull - cont MMi 5 mg 1 tab Mon-Wed-Fri - monitor TFTs  4. Hyperlipidemia - stable, cont statin  5. Bone Health -osteopenia - repeat DXA 2025 - cont OTC Ca + D BID - discussed weight bearing exercises  6. vit D def - cont daily OTC vit D3 2000 units daily  7. low normal B12 - cont daily OTC b12

## 2024-02-28 NOTE — PHYSICAL EXAM
[Obese] : obese [Alert] : alert [No Acute Distress] : no acute distress [EOMI] : extra ocular movement intact [No Respiratory Distress] : no respiratory distress [Normal S1, S2] : normal S1 and S2 [Clear to Auscultation] : lungs were clear to auscultation bilaterally [No Edema] : no peripheral edema [Normal Rate] : heart rate was normal [Soft] : abdomen soft [Not Tender] : non-tender [Normal Insight/Judgement] : insight and judgment were intact [Oriented x3] : oriented to person, place, and time [Normal Affect] : the affect was normal [Normal Mood] : the mood was normal

## 2024-02-28 NOTE — REVIEW OF SYSTEMS
[As Noted in HPI] : as noted in HPI [Pain/Numbness of Digits] : pain/numbness of digits [Blurred Vision] : no blurred vision [Chest Pain] : no chest pain [Shortness Of Breath] : no shortness of breath [Nausea] : no nausea [Abdominal Pain] : no abdominal pain [Polyuria] : no polyuria [Nocturia] : no nocturia [Polydipsia] : no polydipsia [FreeTextEntry2] : weight stable

## 2024-02-28 NOTE — HISTORY OF PRESENT ILLNESS
[FreeTextEntry1] : Interval Hx - worsening finger pains/swelling and following with Rheumatology  1. T2DM Quality: Type 2 Severity: moderate Duration: 20+ years Onset: blood test  ASSOCIATED SYMPTOMS/COMPLICATIONS: 2023 eye exam mild NPDR, no DME s/p focal laser (+) albuminuria on ACEi (-) neuropathy 2015 cardiac cath - nonobstructive disease (+) PAD -  asymptomatic  MODIFYING FACTORS:  stopped jardiance due to yeast infx, stopped januvia when started Trulicity, lost some weight w trulicity Current DM meds: Lantus 20 units at HS (using vials) Trulicity 3 mg weekly  MFN  mg 2 tabs BID  SMBG: denies hypoglycemic symptoms. per pt FS <120's, no lows ------------------------------------------------------------- 2. T3 Toxicosis due to Graves disease - was on Methimazole from 8838-7487.  (+) palpitations and restarted MMI 10/2019 5 mg daily and now on 1 tab mon-wed-fri -------------------------------------------------------------- 3. Thyroid nodules Dx 2011 with benign FNA - denies anterior neck pain. denies voice changes, occasional problem swallowing pills -------------------------------------------------------------------------- 4. HLD - on Lipitor 80 mg daily by cardio

## 2024-03-08 ENCOUNTER — NON-APPOINTMENT (OUTPATIENT)
Age: 79
End: 2024-03-08

## 2024-03-12 ENCOUNTER — OFFICE (OUTPATIENT)
Dept: URBAN - METROPOLITAN AREA CLINIC 115 | Facility: CLINIC | Age: 79
Setting detail: OPHTHALMOLOGY
End: 2024-03-12
Payer: MEDICARE

## 2024-03-12 DIAGNOSIS — H43.813: ICD-10-CM

## 2024-03-12 DIAGNOSIS — E11.3391: ICD-10-CM

## 2024-03-12 DIAGNOSIS — D31.32: ICD-10-CM

## 2024-03-12 DIAGNOSIS — H35.033: ICD-10-CM

## 2024-03-12 DIAGNOSIS — H35.313: ICD-10-CM

## 2024-03-12 DIAGNOSIS — E11.3392: ICD-10-CM

## 2024-03-12 DIAGNOSIS — H43.392: ICD-10-CM

## 2024-03-12 PROCEDURE — 92014 COMPRE OPH EXAM EST PT 1/>: CPT | Performed by: OPHTHALMOLOGY

## 2024-03-12 PROCEDURE — 92134 CPTRZ OPH DX IMG PST SGM RTA: CPT | Performed by: OPHTHALMOLOGY

## 2024-03-12 ASSESSMENT — REFRACTION_CURRENTRX
OS_CYLINDER: -0.50
OD_AXIS: 180
OD_CYLINDER: 0.00
OS_SPHERE: -1.50
OS_OVR_VA: 20/
OD_OVR_VA: 20/
OS_VPRISM_DIRECTION: SV
OD_SPHERE: -2.00
OS_AXIS: 071
OD_VPRISM_DIRECTION: SV

## 2024-03-12 ASSESSMENT — LID EXAM ASSESSMENTS
OD_BLEPHARITIS: RLL
OS_BLEPHARITIS: LLL

## 2024-03-27 ENCOUNTER — APPOINTMENT (OUTPATIENT)
Dept: RHEUMATOLOGY | Facility: CLINIC | Age: 79
End: 2024-03-27
Payer: MEDICARE

## 2024-03-27 VITALS
OXYGEN SATURATION: 94 % | TEMPERATURE: 97.5 F | WEIGHT: 192 LBS | BODY MASS INDEX: 35.33 KG/M2 | SYSTOLIC BLOOD PRESSURE: 129 MMHG | DIASTOLIC BLOOD PRESSURE: 65 MMHG | HEIGHT: 62 IN | HEART RATE: 71 BPM

## 2024-03-27 DIAGNOSIS — M79.641 PAIN IN RIGHT HAND: ICD-10-CM

## 2024-03-27 DIAGNOSIS — M79.642 PAIN IN RIGHT HAND: ICD-10-CM

## 2024-03-27 DIAGNOSIS — M65.341 TRIGGER FINGER, RIGHT RING FINGER: ICD-10-CM

## 2024-03-27 PROCEDURE — 20550 NJX 1 TENDON SHEATH/LIGAMENT: CPT | Mod: RT

## 2024-03-27 PROCEDURE — 99214 OFFICE O/P EST MOD 30 MIN: CPT | Mod: 25

## 2024-03-27 RX ORDER — METHYLPRED ACET/NACL,ISO-OS/PF 40 MG/ML
40 VIAL (ML) INJECTION
Qty: 1 | Refills: 0 | Status: COMPLETED | OUTPATIENT
Start: 2024-03-27

## 2024-03-27 RX ADMIN — Medication 0 MG/ML: at 00:00

## 2024-03-27 RX ADMIN — LIDOCAINE HYDROCHLORIDE 0 %: 10 INJECTION, SOLUTION INFILTRATION; PERINEURAL at 00:00

## 2024-03-27 NOTE — PHYSICAL EXAM
[TextEntry] : GENERAL: Appears in no acute distress HEENT: EOMI, PERRLA. No conjunctival erythema. Moist mucous membranes. No nasopharyngeal ulcers NECK: Supple, no cervical lymphadenopathy, no thyromegaly CARDIOVASCULAR: RRR. S1, S2 auscultated. No murmurs or rubs. PULMONARY: Clear to auscultation b/l, no wheezes, rales, or crackles ABDOMINAL: Soft, nontender, nondistended. Bowel sounds present. No organomegaly. MSK: No active synovitis, swelling, erythema, or warmth. Discomfort to palpation of generalized MCPs and PIPs without tenderness Triggering of R 3rd digit palpable Positive Tinel's tests of b/l median nerves of wrists SKIN: No lesions or rashes NEURO: No focal deficits. PSYCH: AAOx3. Normal affect and thought process.

## 2024-03-27 NOTE — REASON FOR VISIT
Hospitalist Progress Note      PCP: Domi Botello MD    Date of Admission: 3/20/2021     82 y.o. female who is transferred from [facility], ED for neurosurgical consultation. Patient was brought in by her grandson with complaints of increased confusion for the past couple of days and patient complaining of left-sided back pain. Patient was hospitalized here from 3/12 through 3/15, after being transferred from [facility] for possible seizure-like episode. At that time patient was found unresponsive by grandson on the couch and noticed seizure-like activity. In ED patient was with nurse to have a seizure by her RN at bedside which lasted for about 30 seconds. CT head revealed left-sided subdural hematoma with associated mass-effect and left-to-right midline shift. Patient has been taking 81 mg of aspirin and Plavix daily at that time. During the previous hospitalization patient was started on Keppra 1 g twice daily.     At baseline patient has underlying dementia and typically oriented to self, place and situation. Follows commands. Subjective:     - ROS unobtainable. No distress. She is more alert today than previous days.      Medications:  Reviewed    Infusion Medications     Scheduled Medications    ferrous sulfate  300 mg Per NG tube Daily    [START ON 4/2/2021] famotidine  20 mg Per NG tube Daily    levETIRAcetam  2,000 mg Per NG tube BID    lacosamide  200 mg Per NG tube BID    cloNIDine  1 patch Transdermal Weekly    amLODIPine  10 mg Oral Daily    piperacillin-tazobactam  3,375 mg Intravenous Q8H    sennosides-docusate sodium  2 tablet Oral BID    polyethylene glycol  17 g Oral Daily    sodium chloride flush  10 mL Intravenous 2 times per day    heparin (porcine)  5,000 Units Subcutaneous Q12H    ezetimibe  10 mg Oral Nightly    lisinopril  40 mg Oral Daily     PRN Meds: hydrALAZINE, sodium chloride flush, naloxone, promethazine **OR** ondansetron, acetaminophen, magnesium sulfate      Intake/Output Summary (Last 24 hours) at 4/1/2021 1449  Last data filed at 4/1/2021 1056  Gross per 24 hour   Intake 960 ml   Output 2300 ml   Net -1340 ml       Physical Exam Performed:    BP (!) 144/54   Pulse 65   Temp 97 °F (36.1 °C) (Axillary)   Resp 24   Ht 5' 6\" (1.676 m)   Wt 131 lb 10.5 oz (59.7 kg)   SpO2 92%   BMI 21.25 kg/m²     General appearance: drowsy  Head: dressing C/D  HENT: + NG  Respiratory:  Transmitted upper airway sounds. Diminished breath sounds at bases  Cardiovascular: Regular rate and rhythm with normal S1/S2   Abdomen: Soft, non-tender, non-distended   Neurologic: aphasic, right hemiplegia, Somnolent, Localizes pain. No verbal response. Labs:   No results for input(s): WBC, HGB, HCT, PLT in the last 72 hours. Recent Labs     03/30/21  0330 03/30/21  0330 03/30/21  2320 03/31/21  0426 04/01/21  0438   *   < > 136 137 133*   K 4.0  --   --  3.8 4.2   CL 97*  --   --  100 98*   CO2 24  --   --  25 25   BUN 22*  --   --  16 14   CREATININE 1.0  --   --  0.7 0.6   CALCIUM 8.3  --   --  8.6 8.8    < > = values in this interval not displayed. No results for input(s): AST, ALT, BILIDIR, BILITOT, ALKPHOS in the last 72 hours. No results for input(s): INR in the last 72 hours.   Urinalysis:      Lab Results   Component Value Date    NITRU Negative 03/30/2021    WBCUA 0-2 03/30/2021    BACTERIA Rare 03/30/2021    RBCUA 3-4 03/30/2021    BLOODU TRACE-INTACT 03/30/2021    SPECGRAV 1.020 03/30/2021    GLUCOSEU Negative 03/30/2021       Assessment/Plan:    Active Hospital Problems    Diagnosis    Severe malnutrition ([facility] [address] [E43]    Hyponatremia [E87.1]    Encephalopathy acute [G93.40]    Status epilepticus ([facility] [address] [G40.901]    Dementia without behavioral disturbance (HCC) [F03.90]    Subdural hematoma (HCC) [S06.5X9A]   Acute encephalopathy sec to ICH:  Worsening subdural hematoma, with acute and chronic components with increased mass-effect  Baseline dementia  Repeat CT head - Unchanged left cerebral convexity subdural hematoma with 8 mm rightward subfalcine herniation. Neurosurgery consulted - now s/p LEFT CRANIOTOMY FOR SUBDURAL HEMATOMA EVACUATION  No antiplatelets  Subq heparin ok with neurosurgery, continue    Recurrent seizures, breakthrough seizures  - Pxt appears to have hx of Generalized tonic-clonic seizures on Keppra  -Recurrent seizures likely in the setting of subdural hematoma, with acute and chronic components with increased mass-effect  -Also noted with hyponatremia, although not low enough to be causing her seizures. Improved. - Continue on Keppra 2 g IV twice daily. Vimpat added for recurrent seizures even while on higher dose Keppra  Urinalysis: neg for features of infection  - Seizure and aspiration precautions.   - Neurology consulted. Appreciate input.  - CEEG completed  - MRI brain with small punctate area of stroke. Discussed with neuro, unlikely to be contributing to current presentation. Could consider further stroke work-up but it is unlikely to      Bradycardia. HR now improving  Coreg held  TSH previously checked, ok  No fevers or leukocytosis  Monitor and replace electrolytes  Hold Zyprexa  Cardiology on board last week, hx of bradycardia noted. (Previously? Off AV blocking agents)  Continue to hold Coreg    Hyponatremia, resolved  Probable hypovolemic hyponatremia from low oral intake. Also considered cerebral salt wasting related to intracranial process or SIADH  - Nephrology following    Acute resp failure with hypoxia  - Likely sec to seizures/benzodiazepines, Aspiration during seizures leann with dysphagia from [address], with aspiration pneumonia. Improving on Zosyn 3L -> 1 L today    Aspiration pneumonia  - Aspiration during seizures leann with dysphagia from [address]  - CXR 3/25 following initial seizure: nil acute. Repeated 3/27: some infiltrates bilaterally  - Has had fevers likely from asp pneumonitis.  Nil further since startig antibx. - Obtained blood cx: NGTD  - Started on empiric BS antibx with fevers and significant amount of milky liquid suctioned from her mouth noted in drainage jar. CXR 3/27: infiltrates. - Will DC Vanc for now, MRSA screen negative. - Asp precautions  - Continues on Zosyn day 5    Acute urinary retention  Bladder scanned for 660 cc  UA: not sugg of infection  [place] is out    Severe Protein calorie malnutrition:  Body mass index is 21.25 kg/m². Still very lethargic and not safe for po intake. NG placed. Resume TF. Will need peg- GI consulted. Timing of PEG: Per GI when more stable. Weaning O2. Patient is more alert and weaning oxygen, will notify GI of current progress and see when may be able to schedule    Left hip pain and echymosis:  Likely sec to fall. Not on any blood thinners  Repeat xray of hip negative. CAD/Ischemic cardiomyopathy   Ejection fraction of 30-35%, moderate MR and moderate to severe AR  -Was on aspirin and Plavix, which were discontinued during previous hospitalization  Continue on statin  Continue ACEI  BB held for bradycardia    HTN: uncontrolled  Continue lisinopril  Coreg held due to recurrence of bradycardia  Monitor Bps and adjust meds as needed: Currently has been high in the setting of recurrent seizures. Amlodipine increased, clonidine patch added    History of MGUS-follows with hematology-oncology    Dementia  - Reportedly at baseline, patient has underlying dementia and typically oriented to self, place and situation.  - Delirium protocol      Leg swelling, mild, more apparent on R side  Has been on heparin subq however long hospital stay, at risk for clot  Dopplers LE    DVT Prophylaxis: SCDs, Subq heparin  Diet: DIET TUBE FEED CONTINUOUS/CYCLIC NPO; STANDARD WITH FIBER (Jevity 1.2); Nasogastric; 30; 55; 24  Code Status: DNR-CCA    PT/OT Eval Status: 14 and 15 respectively: SNF planned.       Disposition   Remains in pxt pending progress  Pall Med following  Plan to discuss goals of care again with family - family still wants to continue care, PEG.      Naif Cherry, DO [Follow-Up: _____] : a [unfilled] follow-up visit

## 2024-03-27 NOTE — HISTORY OF PRESENT ILLNESS
[FreeTextEntry1] : HISTORY:  79 y/o female w/ T2DM on insulin, HLD, hyperthyroidism, Afib on Eliquis presents as follow up for b/l hands swelling, stiffness, and weakness.  Pt reports b/l hands MCPs and PIPs pains, swelling x 3 months worst with use. Pt also reports trigger finger of R 3rd digits.  Pt reports hip pains with MR with bursitis. Pt has known C-spine LUE radiculopathy s/p PT - follows with ortho but deferred epidural injection at the time.  Mother - RA   Patient as b/l hands MCPs, PIPs pains in setting of family Hx of RA. Although RA is definitely a consideration, patient does not have synovitis on exam and pains are worse with movement rather than in morning or rest - not characteristic of inflammatory arthritis.   INTERVAL HISTORY:  Labwork by me without signs of inflammatory arthritis. XR b/l hands with OA. US with R 3rd trigger finger, cysts. Denies any changes in symptoms.  WORKUP:  Normal/neg (1/2024): CBC (Hgb 11.0, MCV 84.7), CMP (GFR 59), ESR, CRP, RF, CCP, 14.3.3 eta protein, Hep B/C, Quantiferon TB  US R hand (2/2024): Minimal fluid distention of 2nd and 3rd PIPs. Retinacular cysts of 3rd flexor tendon sheath distal to the MCP. R 3rd trigger finger. 1st CMC degenerative joint capsule thickening.  US L hand (2/2024): Small effusion of 3rd PIP. Ganglion cyst of ulnar aspect of L 2nd flexor tendon sheath at the MCP joint level. 1st CMC degenerative joint capsule thickening.

## 2024-03-27 NOTE — ASSESSMENT
[FreeTextEntry1] : 79 y/o female w/ T2DM on insulin, HLD, hyperthyroidism, Afib on Eliquis presents as follow up for b/l hands swelling, stiffness, and weakness.  Pt reports b/l hands MCPs and PIPs pains, swelling x 3 months worst with use. Pt also reports trigger finger of R 3rd digits.  Pt reports hip pains with MR with bursitis. Pt has known C-spine LUE radiculopathy s/p PT - follows with ortho but deferred epidural injection at the time.  Mother - RA   Patient as b/l hands MCPs, PIPs pains in setting of family Hx of RA. Labwork, XRs, US without signs of RA. Pt's symptoms most likely clinically consistent with combination of OA, trigger fingers, and carpal tunnel syndrome.  - R 3rd trigger finger injection today - Obtain MR b/l hands to evaluate for signs of inflammatory arthritis - Pt has gone to PT for her cervical radiculopathy and would like to hold off on OT of hands given limited amount of therapy provided by insurance.  - Pt cannot take NSAIDs due to being on anticoagulation. Pt to continue with Tylenol PRN only.  - Advised to follow up with ortho hand for continuing follow up for trigger fingers, CTS for any further injections or procedures. - Will contact pt with MR results. RTC PRN

## 2024-03-27 NOTE — PROCEDURE
[Today's Date:] : Date: [unfilled] [Patient] : the patient [Risks] : risks [Benefits] : benefits [Alternatives] : alternatives [Consent Obtained] : written consent was obtained prior to the procedure and is detailed in the patient's record [Therapeutic] : therapeutic [#1 Site: ______] : #1 site identified in the [unfilled] [Ethyl Chloride] : ethyl chloride [Alcohol] : alcohol [Tolerated Well] : the patient tolerated the procedure well [No Complications] : there were no complications [Instructions Given] : handouts/patient instructions were given to patient [Patient Instructed to Call] : patient was instructed to call if redness at site, a decrease in range of motion or an increase in pain is noted after procedure. [de-identified] : 30 gauge 1 inch [de-identified] : 0.5ml methylprednisolone, 0.5ml 1% lidocaine

## 2024-04-29 ENCOUNTER — OFFICE (OUTPATIENT)
Dept: URBAN - METROPOLITAN AREA CLINIC 115 | Facility: CLINIC | Age: 79
Setting detail: OPHTHALMOLOGY
End: 2024-04-29
Payer: MEDICARE

## 2024-04-29 DIAGNOSIS — H35.033: ICD-10-CM

## 2024-04-29 DIAGNOSIS — H43.813: ICD-10-CM

## 2024-04-29 DIAGNOSIS — E11.3393: ICD-10-CM

## 2024-04-29 DIAGNOSIS — H40.013: ICD-10-CM

## 2024-04-29 PROCEDURE — 99213 OFFICE O/P EST LOW 20 MIN: CPT | Performed by: OPHTHALMOLOGY

## 2024-04-29 PROCEDURE — 92133 CPTRZD OPH DX IMG PST SGM ON: CPT | Performed by: OPHTHALMOLOGY

## 2024-04-29 ASSESSMENT — LID EXAM ASSESSMENTS
OS_BLEPHARITIS: LLL
OD_BLEPHARITIS: RLL

## 2024-05-02 RX ORDER — DULAGLUTIDE 3 MG/.5ML
3 INJECTION, SOLUTION SUBCUTANEOUS
Qty: 6 | Refills: 1 | Status: COMPLETED | COMMUNITY
Start: 2022-06-08 | End: 2024-05-02

## 2024-05-29 LAB
HBA1C MFR BLD HPLC: 7
LDLC SERPL DIRECT ASSAY-MCNC: 35
TSH SERPL-ACNC: 1.44

## 2024-05-30 ENCOUNTER — APPOINTMENT (OUTPATIENT)
Dept: ENDOCRINOLOGY | Facility: CLINIC | Age: 79
End: 2024-05-30
Payer: MEDICARE

## 2024-05-30 VITALS
OXYGEN SATURATION: 98 % | HEIGHT: 62 IN | HEART RATE: 72 BPM | SYSTOLIC BLOOD PRESSURE: 126 MMHG | WEIGHT: 190 LBS | DIASTOLIC BLOOD PRESSURE: 72 MMHG | BODY MASS INDEX: 34.96 KG/M2

## 2024-05-30 DIAGNOSIS — E78.5 HYPERLIPIDEMIA, UNSPECIFIED: ICD-10-CM

## 2024-05-30 DIAGNOSIS — M85.80 OTHER SPECIFIED DISORDERS OF BONE DENSITY AND STRUCTURE, UNSPECIFIED SITE: ICD-10-CM

## 2024-05-30 DIAGNOSIS — Z79.4 LONG TERM (CURRENT) USE OF INSULIN: ICD-10-CM

## 2024-05-30 DIAGNOSIS — E11.29 TYPE 2 DIABETES MELLITUS WITH OTHER DIABETIC KIDNEY COMPLICATION: ICD-10-CM

## 2024-05-30 DIAGNOSIS — E05.90 THYROTOXICOSIS, UNSPECIFIED W/OUT THYROTOXIC CRISIS OR STORM: ICD-10-CM

## 2024-05-30 DIAGNOSIS — E55.9 VITAMIN D DEFICIENCY, UNSPECIFIED: ICD-10-CM

## 2024-05-30 DIAGNOSIS — E04.2 NONTOXIC MULTINODULAR GOITER: ICD-10-CM

## 2024-05-30 DIAGNOSIS — E11.42 TYPE 2 DIABETES MELLITUS WITH DIABETIC POLYNEUROPATHY: ICD-10-CM

## 2024-05-30 DIAGNOSIS — E11.59 TYPE 2 DIABETES MELLITUS WITH OTHER CIRCULATORY COMPLICATIONS: ICD-10-CM

## 2024-05-30 DIAGNOSIS — R80.9 TYPE 2 DIABETES MELLITUS WITH OTHER DIABETIC KIDNEY COMPLICATION: ICD-10-CM

## 2024-05-30 LAB — GLUCOSE BLDC GLUCOMTR-MCNC: 176

## 2024-05-30 PROCEDURE — 99214 OFFICE O/P EST MOD 30 MIN: CPT

## 2024-05-30 PROCEDURE — 82962 GLUCOSE BLOOD TEST: CPT

## 2024-05-30 NOTE — ASSESSMENT
[FreeTextEntry1] : 78 yr old female with  1. 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, CAD, microalbuminuria, PAD, neuropathy- A1c 7%, mild fasting hyperglycemia by history, worsening control due to lat night binge eating  -intolerant to Jardiance in past - off Trulicity due to pharmacy supply issue, increased Ozempic to 2 mg weekly (can take 2 injections of 1 mg) - cont MFN  mg 2 tabs BID -cont Lantus 10 units at bedtime for now, explained that dose can be increased to help deal with post steroid injection hyperglycemia - repeat A1c 3 months - f/u cardio - f/u vascular as needed, if sx develop - cont Lisinopril - f/u retina  2. thyroid nodules - 2016 and 2021 benign FNA thyroid biopsy, recent sonogram with abnormal thyroid nodule - FNA biopsy was benign - repeat thyroid sono in 1 year  3. Hyperthyroid - euthyroid, normal TSH, has been on MMi for a while, trial of dose reduction in 2019 was unsucessfull - cont MMi 5 mg 1 tab Mon-Wed-Fri which is working for her and TFTs have been stable - monitor TFTs  4. Hyperlipidemia - stable, cont statin  5. Bone Health -osteopenia - repeat DXA 2025 - cont OTC Ca + D BID - discussed weight bearing exercises  6. vit D def - cont daily OTC vit D3 2000 units daily  7. low normal B12 - cont daily OTC b12

## 2024-05-30 NOTE — PHYSICAL EXAM
[Alert] : alert [Obese] : obese [No Acute Distress] : no acute distress [EOMI] : extra ocular movement intact [No Respiratory Distress] : no respiratory distress [Clear to Auscultation] : lungs were clear to auscultation bilaterally [Normal S1, S2] : normal S1 and S2 [Normal Rate] : heart rate was normal [No Edema] : no peripheral edema [Not Tender] : non-tender [Soft] : abdomen soft [Oriented x3] : oriented to person, place, and time [Normal Affect] : the affect was normal [Normal Insight/Judgement] : insight and judgment were intact [Normal Mood] : the mood was normal

## 2024-05-30 NOTE — REVIEW OF SYSTEMS
[As Noted in HPI] : as noted in HPI [Pain/Numbness of Digits] : pain/numbness of digits [Palpitations] : palpitations [Blurred Vision] : no blurred vision [Chest Pain] : no chest pain [Shortness Of Breath] : no shortness of breath [Nausea] : no nausea [Abdominal Pain] : no abdominal pain [Polyuria] : no polyuria [Nocturia] : no nocturia [Polydipsia] : no polydipsia [FreeTextEntry2] : weight stable [FreeTextEntry5] : following w cardio

## 2024-05-30 NOTE — HISTORY OF PRESENT ILLNESS
[FreeTextEntry1] : Interval Hx - worsening finger pains/swelling andnow with RIght hip pain and bursitis - to see ortho and pain management. Now on Ozempic 1 mg weekly. Feeling very tired.  Not watching diet -binge eating night 8 pm   1. T2DM Quality: Type 2 Severity: moderate Duration: 20+ years Onset: blood test  ASSOCIATED SYMPTOMS/COMPLICATIONS: 2023 eye exam mild NPDR, no DME s/p focal laser (+) albuminuria on ACEi (-) neuropathy 2015 cardiac cath - nonobstructive disease (+) PAD -  asymptomatic  MODIFYING FACTORS:  stopped jardiance due to yeast infx, stopped januvia when started Trulicity, lost some weight w trulicity Current DM meds: Lantus 10 units at HS (using vials) Ozempic 1 mg weekly MFN  mg 2 tabs BID  SMBG: denies hypoglycemic symptoms. forgot logs, FS <140's ------------------------------------------------------------- 2. T3 Toxicosis due to Graves disease - was on Methimazole from 1460-4595.  (+) palpitations and restarted MMI 10/2019 5 mg daily and now on 1 tab mon-wed-fri -------------------------------------------------------------- 3. Thyroid nodules Dx 2011 with benign FNA - denies anterior neck pain. denies voice changes, occasional problem swallowing pills -------------------------------------------------------------------------- 4. HLD - on Lipitor 80 mg daily by cardio

## 2024-05-30 NOTE — DATA REVIEWED
[FreeTextEntry1] : Thyroid FNA 3/16/16 RMP nodule - benign follicular nodule with post hemorrhagic change  Thyroid sono 5/2018 in office - stable thyroid nodules  Thyroid Ultrasound Report 1/31/19  Comparison: Report dated 5/17/18.  Right thyroid lobe  5.9x2.2x2.6   cm - heterogeneous gland Right upper pole heterogeneous nodule 7x4 mm - not vascular, new Right mid pole nodule 1.0x.7x1.0cm - vascular, (+) microcalcifications, stable, prevous FNA 2016 was benign Left Thyroid lobe   6.0x2.8x2.7  cm - heterogeneous gland Left upper pole heterogeneous nodule 7x4 mm -stable prior Left mid pole calcification not seen on images Isthmus .3  cm Impression new small Right thyroid nodule, stable right and left thyroid nodules.  Largest Right thyroid nodule previously biopsied and reported as benign repeat sono 6 months   Thyroid Ultrasound Report 7/11/19  Comparison: Report dated 1/31/19.  Findings:  Right thyroid lobe  5.4x2.4x2.5   cm, enlarged, heterogeneous Right upper pole nodule 9x5x6 mm - increased size, hypoechoic, not vascular Right mid pole nodule 7x6x10 mm with coarse calcification 1 mm- hypoechoic, stable, benign FNA in 2016 Left Thyroid lobe   5.4x2.5x2.7  cm, enlarged, heterogeneous Left upper pole nodule 7x5x5 mm - hypoechoic, not vascular, stable Isthmus .3  cm Impression Right upper pole nodule - slowly increasing in size, repeat sono 4 months stable Right mid pole and Left upper pole nodules  Thyroid Ultrasound Report 11/14/19  Comparison: Report dated 7/11/19.  Findings:  Right thyroid lobe  4.9x2.2x2.2   cm - mildly heterogeneous Right upper pole nodule 9x5x6 mm, stable Right mid pole nodule 93y4w71 mm with 1 mm coarse calcification, scant vascularity, stable, benign FNA in 2018 Left Thyroid lobe  5.2x2.8x2.9   cm - mildly heterogeneous Left upper pole nodule 7x6x5 mm - hypoechoic, stable Isthmus  .3 cm Impression stable bilateral thyroid nodules largest Right mid pole thyroid nodule previously biopsied and reported as benign repeat thyroid sonogram 1 year    Thyroid Ultrasound Report 1/21/21  Comparison: Report dated 11/14/19.  Findings:  Right thyroid lobe  5.1x2.4x2.3   cm -enlarged, homogeneous gland Right upper pole nodule 9x6x6 mm - hypoechoic, not vascular, stable Right mid pole nodule 12x7 mm with 1 mm coarse calcification - vascular, stable, benign FNA in 2018 Left Thyroid lobe  5.9x2.7x2.5   cm - enlarged, homogeneous gland Left  upper pole nodule 8x6x6 mm - stable, hypoechoic Left Lower pole area [id number] mm - heterogeneous are vs nodule Isthmus  .4 cm Impression stable bilateral thyroid nodule possible new Left lower pole nodule vs patchy area of heterogeneous tissue repeat sonogram 4 months    Thyroid Ultrasound Report 6/10/21  Comparison: Report dated 1/21/21.  Findings:  Right thyroid lobe  5.8x2.2x2.8   cm - homogeneous, enlarged Right upper pole nodule 9x6x7 mm - hypoechoic, stable, not vascular Right mid pole nodule 00a1l77 mm with 1 mm calcification- heterogeneous, stable, isoechoic, not vascular, benign FNA in 2018 Left Thyroid lobe 5.2x2.9x2.8    cm - homogeneous, enlarged Left upper pole nodule 4x4x4 mm - hypoechoic, not vascular, decreased size Left lower pole nodule 29f76c50 mm - appears as nodule on images, heterogeneous, not vascular Isthmus .7  cm Impression stable Right upper pole nodule and stable Right mid pole nodule Left upper pole nodule smaller Left lower pole nodule appears as discrete nodule on images and meets criteria for FNA biopsy   Thyroid FNA 10/6/21 Left LP nodule - benign, cat 2, adenomatous nodular goiter  Thyroid Ultrasound Report 12/8/22  Comparison: Report dated 6/10/21.  Findings:  Right thyroid lobe 5.8x2.5x2.5    cm Right upper pole nodule 12x7x7 mm - heterogeneous, hypoechoic, no microcalcifications, slight increase in size Right mid pole nodule 51k5c84 mm w coarse calcification - heterogeneous, hypoechoic, some vascularity, stable (benign FNA in 2016) Left Thyroid lobe  6.2x3.2x1.9   cm Left upper pole nodule 4x4x5 mm - heterogeneous, hypoechoic, not vascular, stable Left lower pole nodule [id number] mm - heterogeneous, hypoechoic, not vascular, stable (benign fna in 2021) Isthmus 0.6  cm Impression bilateral thyroid nodules most of which are stable, slight increased to the right upper thyroid nodules. Bilateral dominant nodules with benign FNA biopsy in the past repeat sonogram in 6 months   Thyroid Ultrasound Report 6/1/23  Comparison: Report dated 12/8/22.  Findings:  Right thyroid lobe  5.4x2.8x2.8   cm - heterogeneous gland Right upper pole nodule 12x7x8 mm - stable, complex, not vascular Right mid pole nodule 58v3l89 mm w 1 mm coarse calcification - stable, isoechoic, heterogeneous  (benign FNA in 2016) Left Thyroid lobe   6.7x3.4x3.3  cm - heterogeneous gland Left upper pole nodule 6x5x5 mm - complex, not vascular, stable Left lower pole nodule 87h14f86 mm - stable, heterogeneous, complex, not vascular (benign fna in 2021) Isthmus  0.7 cm Impression: Multiple thyroid nodules that are overall stable in size and appearance.  There are no significant changes when allowing for differences in measurement technique. Bilateral dominant nodules with benign FNA biopsy in the past repeat sono 1 year   thyroid sono 2/12/24 isthmus nodule 83o33s33 mm  - tr2 RMP 97m18d6 mm - tr5 (previously bippsied in 2016) LMP nodule 95n21e06 mmm - tr5 LMP 14p78w05 mm (previosy FNA 2021 benign)   Thyroid FNA results 3/18/24  RMP nodule 1.4 cm - benign follicular nodule c/w nodular goiter, post hemorrhagic change, cat 2  LMP nodule 2.2 cm - benign follicular nodule -=================================================== DXA 5/9/23 osteopenia, FRAX MOF 15%, hip fx 2.5% ======================================================= labs 9/14/18 Cr 0.95 GFR 60 neg urine microalb/Cr 20 LDL chol 37, HDl 51 TSh 2.10 Ft4 1.1, FT3 2.8 B12 1064  Labs 4/5/19 Gluc 115, A1c 6.4% Cr 0.86, GFR 68 urine microalb/Cr 39 LDL 48, HDL 52, Tg 104 TSH 1.43, FT4 1.0 A1c 6.5% Vit D 31  Labs 1/4/19 Cr 0.95, GFR 60 urine microalb/Cr 34 LDl chol 54, Tg 124 TSh 2.03, FT4 1.2 A1c 6.3%  Labs 7/12/19 A1c 7.4% urine microalb/Cr 29 LDL 50, HDL 41, Tg 143 TSH 0.57, FT4 1.1, Ft3 3.0  Lans 10/4/19 Gluc 137, A1c 6.5% Cr 0.89, GFR 65 LDL 39, HDL 43, Tg 93 TSH 0.15, FT4 1.2, FT3 3.6  Labs 1/10/20 Gluc 121, A1c 7.1 Cr 0.85, GFR 68 urine microalb/Cr 67 LDL 46, HDL 57, Tg 78 TSH 0.91, FT4 0.9 mild anemia B12 707  Labs 11/4/20 urine alb/Cr 39 LDL 48, HDL 52, Tg 102 Gluc 139, A1c 6.8 Cr 0.90, GFR 63 TSH 2.38, Ft4 1.1 Hct 33.1  Labs 2/4/21 urine alb/Cr 52 LDL 48, HDL 41, Tg 121 GLuc 164, A1c 7.9 TSH 2.14 Cr 0.91, GFR 63 Hb 10.7, Hct 33.3 B12 772  Labs 5/12/21 HDL 46, LDL 48, Tg 96 Gluc 121, A1c 7.4 Cr 0.85, GFR 68 TSH 2.53, FT4 1.0. FT3 2.9  Labs 8/13/21 urine alb/Cr 60 LDL 42, HDL 47, Tg 112 Gluc 131, A1c 7.3 Cr 0.97, GFR 58 TSH 2.65, Ft4 0.9 Hb 10.5  Labs 11/10/21 LDL 51, HDL 51, Tg 115 Gluc 167, A1c 7.5 Cr 0.87, GFR 66 TSH 2.39, FT4 1.0, FT3 3.1 Hb 11  Labs 2/25/22 urine alb/Cr 19 LDL 39, HDL 41, Tg 147 Gluc 162, A1c 8.5 Cr 1.04, GFR 53 TSH 1.91  labs 6/1/22 HDL 44, LDL 45, Tg 105 Gluc 131, A1c 8.4 Cr 0.98, GFR 57 TSH 1.48, FT4 1.0, FT3 2.9  Labs 10/11/22 LDL 38, HDL 44, Tg 100 A1c 6.6 TSH 3.31, FT4 0.9, FT3 3.3  Labs 3/14/23 urine alb/Cr 167 LDL 51, HDL 55, Trig 84 Gluc 96, A1c 7.3 Cr 0.86, GFR 70 TSH 1.37, FT4 1.0. FT3 3.2  Labs 6/15/23 urine alb/Cr 43 LDL 36, HDL 48, Trig 68 Gluc 112, A1c 6.8 Cr 0.87, GFR 69 TSH 1.31, FT4 1.0, FT3 3.0 vit D 29  Labs 8/2023 normal plasma metanephrines  Labs 10/18/23 urine alb/cr 63 LDL 41, HDL 43, Trig 91 A1c 6.1, Gluc 110 B12 236  Labs 2/21/24 urine alb/Cr 68 LDL 38, HDL 46, Trig 93 A1c 6, Gluc 96 TSH 1.29  Labs 5/23/24 LDL 35, HDL 52, Trig 85 Gluc 127, A1c 7% Cr 0.87, GFR 69 TSH 1.44, Ft4 1.0

## 2024-06-18 RX ORDER — METFORMIN ER 500 MG 500 MG/1
500 TABLET ORAL
Qty: 360 | Refills: 2 | Status: ACTIVE | COMMUNITY
Start: 2021-08-19 | End: 1900-01-01

## 2024-06-18 RX ORDER — METHIMAZOLE 5 MG/1
5 TABLET ORAL
Qty: 45 | Refills: 0 | Status: ACTIVE | COMMUNITY
Start: 2019-10-18 | End: 1900-01-01

## 2024-09-05 ENCOUNTER — APPOINTMENT (OUTPATIENT)
Dept: ENDOCRINOLOGY | Facility: CLINIC | Age: 79
End: 2024-09-05
Payer: MEDICARE

## 2024-09-05 VITALS
OXYGEN SATURATION: 97 % | SYSTOLIC BLOOD PRESSURE: 117 MMHG | WEIGHT: 186 LBS | HEIGHT: 62 IN | HEART RATE: 67 BPM | BODY MASS INDEX: 34.23 KG/M2 | DIASTOLIC BLOOD PRESSURE: 68 MMHG

## 2024-09-05 DIAGNOSIS — E11.59 TYPE 2 DIABETES MELLITUS WITH OTHER CIRCULATORY COMPLICATIONS: ICD-10-CM

## 2024-09-05 DIAGNOSIS — E55.9 VITAMIN D DEFICIENCY, UNSPECIFIED: ICD-10-CM

## 2024-09-05 DIAGNOSIS — Z79.4 LONG TERM (CURRENT) USE OF INSULIN: ICD-10-CM

## 2024-09-05 DIAGNOSIS — E05.90 THYROTOXICOSIS, UNSPECIFIED W/OUT THYROTOXIC CRISIS OR STORM: ICD-10-CM

## 2024-09-05 DIAGNOSIS — M85.80 OTHER SPECIFIED DISORDERS OF BONE DENSITY AND STRUCTURE, UNSPECIFIED SITE: ICD-10-CM

## 2024-09-05 DIAGNOSIS — R80.9 TYPE 2 DIABETES MELLITUS WITH OTHER DIABETIC KIDNEY COMPLICATION: ICD-10-CM

## 2024-09-05 DIAGNOSIS — E11.29 TYPE 2 DIABETES MELLITUS WITH OTHER DIABETIC KIDNEY COMPLICATION: ICD-10-CM

## 2024-09-05 DIAGNOSIS — E11.42 TYPE 2 DIABETES MELLITUS WITH DIABETIC POLYNEUROPATHY: ICD-10-CM

## 2024-09-05 DIAGNOSIS — E78.5 HYPERLIPIDEMIA, UNSPECIFIED: ICD-10-CM

## 2024-09-05 DIAGNOSIS — E04.2 NONTOXIC MULTINODULAR GOITER: ICD-10-CM

## 2024-09-05 LAB — GLUCOSE BLDC GLUCOMTR-MCNC: 129

## 2024-09-05 PROCEDURE — 99214 OFFICE O/P EST MOD 30 MIN: CPT

## 2024-09-05 PROCEDURE — 82962 GLUCOSE BLOOD TEST: CPT

## 2024-09-05 NOTE — DATA REVIEWED
[FreeTextEntry1] : Thyroid FNA 3/16/16 RMP nodule - benign follicular nodule with post hemorrhagic change  Thyroid sono 5/2018 in office - stable thyroid nodules  Thyroid Ultrasound Report 1/31/19  Comparison: Report dated 5/17/18.  Right thyroid lobe  5.9x2.2x2.6   cm - heterogeneous gland Right upper pole heterogeneous nodule 7x4 mm - not vascular, new Right mid pole nodule 1.0x.7x1.0cm - vascular, (+) microcalcifications, stable, prevous FNA 2016 was benign Left Thyroid lobe   6.0x2.8x2.7  cm - heterogeneous gland Left upper pole heterogeneous nodule 7x4 mm -stable prior Left mid pole calcification not seen on images Isthmus .3  cm Impression new small Right thyroid nodule, stable right and left thyroid nodules.  Largest Right thyroid nodule previously biopsied and reported as benign repeat sono 6 months   Thyroid Ultrasound Report 7/11/19  Comparison: Report dated 1/31/19.  Findings:  Right thyroid lobe  5.4x2.4x2.5   cm, enlarged, heterogeneous Right upper pole nodule 9x5x6 mm - increased size, hypoechoic, not vascular Right mid pole nodule 7x6x10 mm with coarse calcification 1 mm- hypoechoic, stable, benign FNA in 2016 Left Thyroid lobe   5.4x2.5x2.7  cm, enlarged, heterogeneous Left upper pole nodule 7x5x5 mm - hypoechoic, not vascular, stable Isthmus .3  cm Impression Right upper pole nodule - slowly increasing in size, repeat sono 4 months stable Right mid pole and Left upper pole nodules  Thyroid Ultrasound Report 11/14/19  Comparison: Report dated 7/11/19.  Findings:  Right thyroid lobe  4.9x2.2x2.2   cm - mildly heterogeneous Right upper pole nodule 9x5x6 mm, stable Right mid pole nodule 64c8k88 mm with 1 mm coarse calcification, scant vascularity, stable, benign FNA in 2018 Left Thyroid lobe  5.2x2.8x2.9   cm - mildly heterogeneous Left upper pole nodule 7x6x5 mm - hypoechoic, stable Isthmus  .3 cm Impression stable bilateral thyroid nodules largest Right mid pole thyroid nodule previously biopsied and reported as benign repeat thyroid sonogram 1 year    Thyroid Ultrasound Report 1/21/21  Comparison: Report dated 11/14/19.  Findings:  Right thyroid lobe  5.1x2.4x2.3   cm -enlarged, homogeneous gland Right upper pole nodule 9x6x6 mm - hypoechoic, not vascular, stable Right mid pole nodule 12x7 mm with 1 mm coarse calcification - vascular, stable, benign FNA in 2018 Left Thyroid lobe  5.9x2.7x2.5   cm - enlarged, homogeneous gland Left  upper pole nodule 8x6x6 mm - stable, hypoechoic Left Lower pole area 04b88a26 mm - heterogeneous are vs nodule Isthmus  .4 cm Impression stable bilateral thyroid nodule possible new Left lower pole nodule vs patchy area of heterogeneous tissue repeat sonogram 4 months    Thyroid Ultrasound Report 6/10/21  Comparison: Report dated 1/21/21.  Findings:  Right thyroid lobe  5.8x2.2x2.8   cm - homogeneous, enlarged Right upper pole nodule 9x6x7 mm - hypoechoic, stable, not vascular Right mid pole nodule 45o0r71 mm with 1 mm calcification- heterogeneous, stable, isoechoic, not vascular, benign FNA in 2018 Left Thyroid lobe 5.2x2.9x2.8    cm - homogeneous, enlarged Left upper pole nodule 4x4x4 mm - hypoechoic, not vascular, decreased size Left lower pole nodule 85t15b81 mm - appears as nodule on images, heterogeneous, not vascular Isthmus .7  cm Impression stable Right upper pole nodule and stable Right mid pole nodule Left upper pole nodule smaller Left lower pole nodule appears as discrete nodule on images and meets criteria for FNA biopsy   Thyroid FNA 10/6/21 Left LP nodule - benign, cat 2, adenomatous nodular goiter  Thyroid Ultrasound Report 12/8/22  Comparison: Report dated 6/10/21.  Findings:  Right thyroid lobe 5.8x2.5x2.5    cm Right upper pole nodule 12x7x7 mm - heterogeneous, hypoechoic, no microcalcifications, slight increase in size Right mid pole nodule 48u7v02 mm w coarse calcification - heterogeneous, hypoechoic, some vascularity, stable (benign FNA in 2016) Left Thyroid lobe  6.2x3.2x1.9   cm Left upper pole nodule 4x4x5 mm - heterogeneous, hypoechoic, not vascular, stable Left lower pole nodule 93x80p70 mm - heterogeneous, hypoechoic, not vascular, stable (benign fna in 2021) Isthmus 0.6  cm Impression bilateral thyroid nodules most of which are stable, slight increased to the right upper thyroid nodules. Bilateral dominant nodules with benign FNA biopsy in the past repeat sonogram in 6 months   Thyroid Ultrasound Report 6/1/23  Comparison: Report dated 12/8/22.  Findings:  Right thyroid lobe  5.4x2.8x2.8   cm - heterogeneous gland Right upper pole nodule 12x7x8 mm - stable, complex, not vascular Right mid pole nodule 27u1y23 mm w 1 mm coarse calcification - stable, isoechoic, heterogeneous  (benign FNA in 2016) Left Thyroid lobe   6.7x3.4x3.3  cm - heterogeneous gland Left upper pole nodule 6x5x5 mm - complex, not vascular, stable Left lower pole nodule 88o98x72 mm - stable, heterogeneous, complex, not vascular (benign fna in 2021) Isthmus  0.7 cm Impression: Multiple thyroid nodules that are overall stable in size and appearance.  There are no significant changes when allowing for differences in measurement technique. Bilateral dominant nodules with benign FNA biopsy in the past repeat sono 1 year   thyroid sono 2/12/24 isthmus nodule 13t94i49 mm  - tr2 RMP 69c08b7 mm - tr5 (previously bippsied in 2016) LMP nodule 56a27h28 mmm - tr5 LMP 84z00i26 mm (previosy FNA 2021 benign)   Thyroid FNA results 3/18/24  RMP nodule 1.4 cm - benign follicular nodule c/w nodular goiter, post hemorrhagic change, cat 2  LMP nodule 2.2 cm - benign follicular nodule -=================================================== DXA 5/9/23 osteopenia, FRAX MOF 15%, hip fx 2.5% ======================================================= labs 9/14/18 Cr 0.95 GFR 60 neg urine microalb/Cr 20 LDL chol 37, HDl 51 TSh 2.10 Ft4 1.1, FT3 2.8 B12 1064  Labs 4/5/19 Gluc 115, A1c 6.4% Cr 0.86, GFR 68 urine microalb/Cr 39 LDL 48, HDL 52, Tg 104 TSH 1.43, FT4 1.0 A1c 6.5% Vit D 31  Labs 1/4/19 Cr 0.95, GFR 60 urine microalb/Cr 34 LDl chol 54, Tg 124 TSh 2.03, FT4 1.2 A1c 6.3%  Labs 7/12/19 A1c 7.4% urine microalb/Cr 29 LDL 50, HDL 41, Tg 143 TSH 0.57, FT4 1.1, Ft3 3.0  Lans 10/4/19 Gluc 137, A1c 6.5% Cr 0.89, GFR 65 LDL 39, HDL 43, Tg 93 TSH 0.15, FT4 1.2, FT3 3.6  Labs 1/10/20 Gluc 121, A1c 7.1 Cr 0.85, GFR 68 urine microalb/Cr 67 LDL 46, HDL 57, Tg 78 TSH 0.91, FT4 0.9 mild anemia B12 707  Labs 11/4/20 urine alb/Cr 39 LDL 48, HDL 52, Tg 102 Gluc 139, A1c 6.8 Cr 0.90, GFR 63 TSH 2.38, Ft4 1.1 Hct 33.1  Labs 2/4/21 urine alb/Cr 52 LDL 48, HDL 41, Tg 121 GLuc 164, A1c 7.9 TSH 2.14 Cr 0.91, GFR 63 Hb 10.7, Hct 33.3 B12 772  Labs 5/12/21 HDL 46, LDL 48, Tg 96 Gluc 121, A1c 7.4 Cr 0.85, GFR 68 TSH 2.53, FT4 1.0. FT3 2.9  Labs 8/13/21 urine alb/Cr 60 LDL 42, HDL 47, Tg 112 Gluc 131, A1c 7.3 Cr 0.97, GFR 58 TSH 2.65, Ft4 0.9 Hb 10.5  Labs 11/10/21 LDL 51, HDL 51, Tg 115 Gluc 167, A1c 7.5 Cr 0.87, GFR 66 TSH 2.39, FT4 1.0, FT3 3.1 Hb 11  Labs 2/25/22 urine alb/Cr 19 LDL 39, HDL 41, Tg 147 Gluc 162, A1c 8.5 Cr 1.04, GFR 53 TSH 1.91  labs 6/1/22 HDL 44, LDL 45, Tg 105 Gluc 131, A1c 8.4 Cr 0.98, GFR 57 TSH 1.48, FT4 1.0, FT3 2.9  Labs 10/11/22 LDL 38, HDL 44, Tg 100 A1c 6.6 TSH 3.31, FT4 0.9, FT3 3.3  Labs 3/14/23 urine alb/Cr 167 LDL 51, HDL 55, Trig 84 Gluc 96, A1c 7.3 Cr 0.86, GFR 70 TSH 1.37, FT4 1.0. FT3 3.2  Labs 6/15/23 urine alb/Cr 43 LDL 36, HDL 48, Trig 68 Gluc 112, A1c 6.8 Cr 0.87, GFR 69 TSH 1.31, FT4 1.0, FT3 3.0 vit D 29  Labs 8/2023 normal plasma metanephrines  Labs 10/18/23 urine alb/cr 63 LDL 41, HDL 43, Trig 91 A1c 6.1, Gluc 110 B12 236  Labs 2/21/24 urine alb/Cr 68 LDL 38, HDL 46, Trig 93 A1c 6, Gluc 96 TSH 1.29  Labs 5/23/24 LDL 35, HDL 52, Trig 85 Gluc 127, A1c 7% Cr 0.87, GFR 69 TSH 1.44, Ft4 1.0  Labs 8/29/24 LDL 33, HDL 52, Trig 89 Gluc 111, A1c 7% Cr 0.81, GFR 75 TSH 1.34, FT4 1.0, FT3 2.7

## 2024-09-05 NOTE — ASSESSMENT
[FreeTextEntry1] : 79 yr old female with  1. 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 thyroid nodules - 2016, 2021 and 2024 benign FNA thyroid biopsy, - repeat thyroid sono in 1 year  3. Hyperthyroid - euthyroid, normal TSH, has been on MMi for a while, trial of dose reduction in 2019 was unsucessfull - cont MMi 5 mg 1 tab Mon-Wed-Fri which is working for her and TFTs have been stable - monitor TFTs  4. Hyperlipidemia - stable, cont statin  5. Bone Health, post menopausal -osteopenia on DXA 2023 - repeat DXA 2025 - cont OTC Ca + D BID - discussed weight bearing exercises  6. vit D def - cont daily OTC vit D3 2000 units daily  7. low normal B12 - cont daily OTC b12

## 2024-09-05 NOTE — REVIEW OF SYSTEMS
[As Noted in HPI] : as noted in HPI [Palpitations] : palpitations [Pain/Numbness of Digits] : pain/numbness of digits [Recent Weight Loss (___ Lbs)] : recent weight loss: [unfilled] lbs [Blurred Vision] : no blurred vision [Shortness Of Breath] : no shortness of breath [Nausea] : no nausea [Abdominal Pain] : no abdominal pain [Polyuria] : no polyuria [Nocturia] : no nocturia [Polydipsia] : no polydipsia [FreeTextEntry5] : following w cardio, s/p sleep apnea testing and holter monitor that showed afib episodes

## 2024-09-05 NOTE — PHYSICAL EXAM
[Alert] : alert [Obese] : obese [No Acute Distress] : no acute distress [EOMI] : extra ocular movement intact [Clear to Auscultation] : lungs were clear to auscultation bilaterally [Normal S1, S2] : normal S1 and S2 [Normal Rate] : heart rate was normal [No Edema] : no peripheral edema [Not Tender] : non-tender [Soft] : abdomen soft [Oriented x3] : oriented to person, place, and time [Normal Affect] : the affect was normal [Normal Insight/Judgement] : insight and judgment were intact [Normal Mood] : the mood was normal [Thyroid Not Enlarged] : the thyroid was not enlarged [Normal Rate and Effort] : normal respiratory rate and effort [2+] : 2+ in the dorsalis pedis [Vibration Dec.] : diminished vibratory sensation at the level of the toes [Normal Sensation on Monofilament Testing] : normal sensation on monofilament testing of lower extremities [Acanthosis Nigricans] : no acanthosis nigricans [Foot Ulcers] : no foot ulcers [Diminished Throughout Both Feet] : normal tactile sensation with monofilament testing throughout both feet

## 2024-09-05 NOTE — HISTORY OF PRESENT ILLNESS
[FreeTextEntry1] : Interval Hx - intolerant to Ozempic 2 mg and had to go back to 1 mg weekly eating less these days SMBG: denies hypoglycemic symptoms. forgot logs, FS <130  1. T2DM Quality: Type 2 Severity: moderate Duration: 20+ years Onset: blood test  ASSOCIATED SYMPTOMS/COMPLICATIONS: 2023 eye exam mild NPDR, no DME s/p focal laser (+) albuminuria on ACEi (-) neuropathy 2015 cardiac cath - nonobstructive disease (+) PAD -  asymptomatic  MODIFYING FACTORS:  stopped jardiance due to yeast infx, stopped januvia when started Trulicity, lost some weight w trulicity Current DM meds: Lantus 10 units at HS (using vials) Ozempic 1 mg weekly MFN  mg 2 tabs BID ------------------------------------------------------------- 2. 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  (+) palpitations and restarted MMI 10/2019 5 mg daily and now on 1 tab mon-wed-fri -------------------------------------------------------------- 3. Thyroid nodules Dx 2011 with benign FNA - denies anterior neck pain. denies voice changes, occasional problem swallowing pills -------------------------------------------------------------------------- 4. HLD - on Lipitor 80 mg daily by cardio

## 2024-09-10 RX ORDER — DAPAGLIFLOZIN 10 MG/1
10 TABLET, FILM COATED ORAL DAILY
Qty: 90 | Refills: 2 | Status: ACTIVE | COMMUNITY
Start: 2024-09-05 | End: 1900-01-01

## 2024-09-17 ENCOUNTER — RX RENEWAL (OUTPATIENT)
Age: 79
End: 2024-09-17

## 2024-10-28 ENCOUNTER — OFFICE (OUTPATIENT)
Dept: URBAN - METROPOLITAN AREA CLINIC 115 | Facility: CLINIC | Age: 79
Setting detail: OPHTHALMOLOGY
End: 2024-10-28
Payer: MEDICARE

## 2024-10-28 DIAGNOSIS — H40.013: ICD-10-CM

## 2024-10-28 DIAGNOSIS — H01.015: ICD-10-CM

## 2024-10-28 DIAGNOSIS — H01.012: ICD-10-CM

## 2024-10-28 DIAGNOSIS — Z96.1: ICD-10-CM

## 2024-10-28 PROCEDURE — 92133 CPTRZD OPH DX IMG PST SGM ON: CPT | Performed by: OPHTHALMOLOGY

## 2024-10-28 PROCEDURE — 99213 OFFICE O/P EST LOW 20 MIN: CPT | Performed by: OPHTHALMOLOGY

## 2024-10-28 PROCEDURE — 92020 GONIOSCOPY: CPT | Performed by: OPHTHALMOLOGY

## 2024-10-28 ASSESSMENT — CONFRONTATIONAL VISUAL FIELD TEST (CVF)
OD_FINDINGS: FULL
OS_FINDINGS: FULL

## 2024-10-28 ASSESSMENT — PACHYMETRY
OD_CT_UM: 572
OD_CT_CORRECTION: -2
OS_CT_CORRECTION: -3
OS_CT_UM: 583

## 2024-10-28 ASSESSMENT — REFRACTION_CURRENTRX
OS_VPRISM_DIRECTION: SV
OS_AXIS: 071
OD_VPRISM_DIRECTION: SV
OS_SPHERE: -1.50
OS_CYLINDER: -0.50
OD_CYLINDER: 0.00
OD_OVR_VA: 20/
OD_SPHERE: -2.00
OD_AXIS: 180
OS_OVR_VA: 20/

## 2024-10-28 ASSESSMENT — LID EXAM ASSESSMENTS
OD_BLEPHARITIS: RLL
OS_BLEPHARITIS: LLL

## 2024-10-28 ASSESSMENT — REFRACTION_AUTOREFRACTION
OS_CYLINDER: -0.50
OD_SPHERE: -1.00
OS_AXIS: 072
OD_AXIS: 107
OS_SPHERE: -1.25
OD_CYLINDER: -0.25

## 2024-10-28 ASSESSMENT — TONOMETRY
OD_IOP_MMHG: 12
OS_IOP_MMHG: 14

## 2024-10-28 ASSESSMENT — VISUAL ACUITY
OS_BCVA: 20/25
OD_BCVA: 20/30

## 2024-12-30 LAB
HBA1C MFR BLD HPLC: 6.9
LDLC SERPL DIRECT ASSAY-MCNC: 43
TSH SERPL-ACNC: 1.54

## 2025-01-02 ENCOUNTER — APPOINTMENT (OUTPATIENT)
Dept: ENDOCRINOLOGY | Facility: CLINIC | Age: 80
End: 2025-01-02
Payer: MEDICARE

## 2025-01-02 VITALS
HEIGHT: 62 IN | DIASTOLIC BLOOD PRESSURE: 58 MMHG | SYSTOLIC BLOOD PRESSURE: 106 MMHG | BODY MASS INDEX: 32.2 KG/M2 | HEART RATE: 74 BPM | WEIGHT: 175 LBS | OXYGEN SATURATION: 97 %

## 2025-01-02 DIAGNOSIS — E11.42 TYPE 2 DIABETES MELLITUS WITH DIABETIC POLYNEUROPATHY: ICD-10-CM

## 2025-01-02 DIAGNOSIS — R80.9 TYPE 2 DIABETES MELLITUS WITH OTHER DIABETIC KIDNEY COMPLICATION: ICD-10-CM

## 2025-01-02 DIAGNOSIS — Z79.4 LONG TERM (CURRENT) USE OF INSULIN: ICD-10-CM

## 2025-01-02 DIAGNOSIS — E04.2 NONTOXIC MULTINODULAR GOITER: ICD-10-CM

## 2025-01-02 DIAGNOSIS — E11.59 TYPE 2 DIABETES MELLITUS WITH OTHER CIRCULATORY COMPLICATIONS: ICD-10-CM

## 2025-01-02 DIAGNOSIS — E05.90 THYROTOXICOSIS, UNSPECIFIED W/OUT THYROTOXIC CRISIS OR STORM: ICD-10-CM

## 2025-01-02 DIAGNOSIS — E11.29 TYPE 2 DIABETES MELLITUS WITH OTHER DIABETIC KIDNEY COMPLICATION: ICD-10-CM

## 2025-01-02 DIAGNOSIS — E78.5 HYPERLIPIDEMIA, UNSPECIFIED: ICD-10-CM

## 2025-01-02 LAB — GLUCOSE BLDC GLUCOMTR-MCNC: 127

## 2025-01-02 PROCEDURE — 82962 GLUCOSE BLOOD TEST: CPT

## 2025-01-02 PROCEDURE — 99214 OFFICE O/P EST MOD 30 MIN: CPT

## 2025-02-12 RX ORDER — TIRZEPATIDE 5 MG/.5ML
5 INJECTION, SOLUTION SUBCUTANEOUS
Qty: 1 | Refills: 0 | Status: ACTIVE | COMMUNITY
Start: 2025-02-11

## 2025-04-09 LAB
HBA1C MFR BLD HPLC: 6.4
LDLC SERPL DIRECT ASSAY-MCNC: 34
MICROALBUMIN/CREAT 24H UR-RTO: 21

## 2025-04-10 ENCOUNTER — APPOINTMENT (OUTPATIENT)
Dept: ENDOCRINOLOGY | Facility: CLINIC | Age: 80
End: 2025-04-10
Payer: MEDICARE

## 2025-04-10 VITALS
HEIGHT: 62 IN | HEART RATE: 70 BPM | OXYGEN SATURATION: 98 % | WEIGHT: 170 LBS | DIASTOLIC BLOOD PRESSURE: 52 MMHG | SYSTOLIC BLOOD PRESSURE: 110 MMHG | BODY MASS INDEX: 31.28 KG/M2

## 2025-04-10 DIAGNOSIS — M85.80 OTHER SPECIFIED DISORDERS OF BONE DENSITY AND STRUCTURE, UNSPECIFIED SITE: ICD-10-CM

## 2025-04-10 DIAGNOSIS — E11.29 TYPE 2 DIABETES MELLITUS WITH OTHER DIABETIC KIDNEY COMPLICATION: ICD-10-CM

## 2025-04-10 DIAGNOSIS — E05.90 THYROTOXICOSIS, UNSPECIFIED W/OUT THYROTOXIC CRISIS OR STORM: ICD-10-CM

## 2025-04-10 DIAGNOSIS — E11.42 TYPE 2 DIABETES MELLITUS WITH DIABETIC POLYNEUROPATHY: ICD-10-CM

## 2025-04-10 DIAGNOSIS — E11.59 TYPE 2 DIABETES MELLITUS WITH OTHER CIRCULATORY COMPLICATIONS: ICD-10-CM

## 2025-04-10 DIAGNOSIS — R80.9 TYPE 2 DIABETES MELLITUS WITH OTHER DIABETIC KIDNEY COMPLICATION: ICD-10-CM

## 2025-04-10 DIAGNOSIS — E04.2 NONTOXIC MULTINODULAR GOITER: ICD-10-CM

## 2025-04-10 DIAGNOSIS — E78.5 HYPERLIPIDEMIA, UNSPECIFIED: ICD-10-CM

## 2025-04-10 LAB — GLUCOSE BLDC GLUCOMTR-MCNC: 122

## 2025-04-10 PROCEDURE — 82962 GLUCOSE BLOOD TEST: CPT

## 2025-04-10 PROCEDURE — 99214 OFFICE O/P EST MOD 30 MIN: CPT

## 2025-06-10 ENCOUNTER — RX RENEWAL (OUTPATIENT)
Age: 80
End: 2025-06-10

## 2025-09-09 LAB
HBA1C MFR BLD HPLC: 6
LDLC SERPL DIRECT ASSAY-MCNC: 36
MICROALBUMIN/CREAT 24H UR-RTO: 48
TSH SERPL-ACNC: 1.71

## 2025-09-10 ENCOUNTER — APPOINTMENT (OUTPATIENT)
Dept: ENDOCRINOLOGY | Facility: CLINIC | Age: 80
End: 2025-09-10
Payer: MEDICARE

## 2025-09-10 VITALS
HEIGHT: 62 IN | DIASTOLIC BLOOD PRESSURE: 60 MMHG | SYSTOLIC BLOOD PRESSURE: 110 MMHG | HEART RATE: 76 BPM | BODY MASS INDEX: 29.26 KG/M2 | OXYGEN SATURATION: 98 % | WEIGHT: 159 LBS

## 2025-09-10 DIAGNOSIS — R80.9 TYPE 2 DIABETES MELLITUS WITH OTHER DIABETIC KIDNEY COMPLICATION: ICD-10-CM

## 2025-09-10 DIAGNOSIS — E04.2 NONTOXIC MULTINODULAR GOITER: ICD-10-CM

## 2025-09-10 DIAGNOSIS — E11.29 TYPE 2 DIABETES MELLITUS WITH OTHER DIABETIC KIDNEY COMPLICATION: ICD-10-CM

## 2025-09-10 DIAGNOSIS — E78.5 HYPERLIPIDEMIA, UNSPECIFIED: ICD-10-CM

## 2025-09-10 DIAGNOSIS — M85.80 OTHER SPECIFIED DISORDERS OF BONE DENSITY AND STRUCTURE, UNSPECIFIED SITE: ICD-10-CM

## 2025-09-10 DIAGNOSIS — E11.59 TYPE 2 DIABETES MELLITUS WITH OTHER CIRCULATORY COMPLICATIONS: ICD-10-CM

## 2025-09-10 DIAGNOSIS — E05.90 THYROTOXICOSIS, UNSPECIFIED W/OUT THYROTOXIC CRISIS OR STORM: ICD-10-CM

## 2025-09-10 LAB — GLUCOSE BLDC GLUCOMTR-MCNC: 126

## 2025-09-10 PROCEDURE — 99214 OFFICE O/P EST MOD 30 MIN: CPT

## 2025-09-10 PROCEDURE — 82962 GLUCOSE BLOOD TEST: CPT

## 2025-09-10 PROCEDURE — G2211 COMPLEX E/M VISIT ADD ON: CPT
